# Patient Record
Sex: MALE | Race: WHITE | NOT HISPANIC OR LATINO | URBAN - METROPOLITAN AREA
[De-identification: names, ages, dates, MRNs, and addresses within clinical notes are randomized per-mention and may not be internally consistent; named-entity substitution may affect disease eponyms.]

---

## 2014-08-25 LAB — CRC RECOMMENDATION EXT: NORMAL

## 2017-08-10 ENCOUNTER — HISTORICAL (OUTPATIENT)
Dept: RADIOLOGY | Facility: HOSPITAL | Age: 52
End: 2017-08-10

## 2023-01-30 LAB
A1C: 7.4 (ref 4–6)
CHOLEST SERPL-MSCNC: 231 MG/DL (ref 25–200)
CHOLEST/HDLC SERPL: 4.7 {RATIO}
HDLC SERPL-MCNC: 49 MG/DL (ref 23–92)
LDLC SERPL CALC-MCNC: 137 MG/DL (ref 0–100)
TRIGL SERPL-MCNC: 227 MG/DL (ref 10–150)
VLDLC SERPL-MCNC: 45 MG/DL (ref 8–18)

## 2023-07-10 ENCOUNTER — TELEPHONE (OUTPATIENT)
Dept: FAMILY MEDICINE | Facility: CLINIC | Age: 58
End: 2023-07-10
Payer: COMMERCIAL

## 2023-07-10 DIAGNOSIS — E78.5 HYPERLIPIDEMIA, UNSPECIFIED HYPERLIPIDEMIA TYPE: ICD-10-CM

## 2023-07-10 DIAGNOSIS — E11.9 TYPE 2 DIABETES MELLITUS WITHOUT COMPLICATION, WITHOUT LONG-TERM CURRENT USE OF INSULIN: Primary | ICD-10-CM

## 2023-07-10 RX ORDER — GLIMEPIRIDE 1 MG/1
1 TABLET ORAL DAILY
COMMUNITY
End: 2023-07-10 | Stop reason: SDUPTHER

## 2023-07-10 RX ORDER — METFORMIN HYDROCHLORIDE 500 MG/1
500 TABLET, EXTENDED RELEASE ORAL 2 TIMES DAILY WITH MEALS
COMMUNITY
End: 2023-07-10 | Stop reason: SDUPTHER

## 2023-07-10 RX ORDER — GLIMEPIRIDE 1 MG/1
TABLET ORAL
Qty: 90 TABLET | Refills: 2 | Status: SHIPPED | OUTPATIENT
Start: 2023-07-10 | End: 2023-07-10

## 2023-07-10 RX ORDER — GLIMEPIRIDE 1 MG/1
1 TABLET ORAL
Qty: 180 TABLET | Refills: 1 | Status: SHIPPED | OUTPATIENT
Start: 2023-07-10 | End: 2023-08-08 | Stop reason: SDUPTHER

## 2023-07-10 RX ORDER — LOVASTATIN 10 MG/1
10 TABLET ORAL NIGHTLY
COMMUNITY
End: 2023-07-10 | Stop reason: SDUPTHER

## 2023-07-10 RX ORDER — METFORMIN HYDROCHLORIDE 500 MG/1
500 TABLET, EXTENDED RELEASE ORAL 2 TIMES DAILY WITH MEALS
Qty: 180 TABLET | Refills: 2 | Status: SHIPPED | OUTPATIENT
Start: 2023-07-10 | End: 2023-09-27 | Stop reason: SDUPTHER

## 2023-07-10 RX ORDER — LOVASTATIN 10 MG/1
10 TABLET ORAL NIGHTLY
Qty: 90 TABLET | Refills: 2 | Status: SHIPPED | OUTPATIENT
Start: 2023-07-10 | End: 2023-08-08 | Stop reason: DRUGHIGH

## 2023-07-10 NOTE — TELEPHONE ENCOUNTER
----- Message from Angle Velarde sent at 7/10/2023 10:57 AM CDT -----  Regarding: MED REFILLS  HE NEEDS REFILLS ON EVERYTHING BUT BP AT [facility], 90 DAY.  THEY SAID THEY SENT FAXES

## 2023-07-11 NOTE — TELEPHONE ENCOUNTER
Patient notified that Dr Hernandez wants to increase Amaryl 1 mg to 1 tablet  bid after meals. Sent new Rx to [facility] . Patient verbalized an under standing.

## 2023-08-04 ENCOUNTER — DOCUMENTATION ONLY (OUTPATIENT)
Dept: PRIMARY CARE CLINIC | Facility: CLINIC | Age: 58
End: 2023-08-04
Payer: COMMERCIAL

## 2023-08-04 PROBLEM — E11.9 TYPE 2 DIABETES MELLITUS WITHOUT COMPLICATIONS: Status: ACTIVE | Noted: 2023-08-04

## 2023-08-04 PROBLEM — E66.9 OBESITY, UNSPECIFIED: Status: ACTIVE | Noted: 2023-08-04

## 2023-08-04 PROBLEM — E78.5 HYPERLIPIDEMIA: Status: ACTIVE | Noted: 2023-08-04

## 2023-08-04 RX ORDER — FLUTICASONE PROPIONATE 50 MCG
1 SPRAY, SUSPENSION (ML) NASAL DAILY
COMMUNITY
End: 2023-09-27 | Stop reason: ALTCHOICE

## 2023-08-04 RX ORDER — GUAIFENESIN AND PHENYLEPHRINE HCL 400; 10 MG/1; MG/1
TABLET ORAL
COMMUNITY

## 2023-08-04 RX ORDER — IRBESARTAN AND HYDROCHLOROTHIAZIDE 150; 12.5 MG/1; MG/1
0.5 TABLET, FILM COATED ORAL EVERY MORNING
COMMUNITY
Start: 2023-07-07 | End: 2023-09-27 | Stop reason: SDUPTHER

## 2023-08-04 RX ORDER — VITAMIN E 268 MG
400 CAPSULE ORAL DAILY
COMMUNITY

## 2023-08-04 RX ORDER — ASCORBIC ACID/MULTIVIT-MIN 1000 MG
EFFERVESCENT POWDER IN PACKET ORAL
COMMUNITY

## 2023-08-07 PROBLEM — G47.33 OSA (OBSTRUCTIVE SLEEP APNEA): Status: ACTIVE | Noted: 2023-08-07

## 2023-08-07 PROBLEM — K76.0 FATTY LIVER: Status: ACTIVE | Noted: 2023-08-07

## 2023-08-07 PROBLEM — R80.9 ALBUMINURIA: Status: ACTIVE | Noted: 2023-08-07

## 2023-08-07 PROBLEM — I10 HYPERTENSION: Status: ACTIVE | Noted: 2023-08-07

## 2023-08-08 ENCOUNTER — OFFICE VISIT (OUTPATIENT)
Dept: FAMILY MEDICINE | Facility: CLINIC | Age: 58
End: 2023-08-08
Payer: COMMERCIAL

## 2023-08-08 VITALS
SYSTOLIC BLOOD PRESSURE: 130 MMHG | TEMPERATURE: 97 F | BODY MASS INDEX: 44.92 KG/M2 | WEIGHT: 313.81 LBS | OXYGEN SATURATION: 96 % | HEART RATE: 69 BPM | RESPIRATION RATE: 16 BRPM | HEIGHT: 70 IN | DIASTOLIC BLOOD PRESSURE: 78 MMHG

## 2023-08-08 DIAGNOSIS — L25.9 CONTACT DERMATITIS, UNSPECIFIED CONTACT DERMATITIS TYPE, UNSPECIFIED TRIGGER: ICD-10-CM

## 2023-08-08 DIAGNOSIS — E11.9 TYPE 2 DIABETES MELLITUS WITHOUT COMPLICATION, WITHOUT LONG-TERM CURRENT USE OF INSULIN: Primary | ICD-10-CM

## 2023-08-08 DIAGNOSIS — E78.2 MIXED HYPERLIPIDEMIA: ICD-10-CM

## 2023-08-08 DIAGNOSIS — I10 PRIMARY HYPERTENSION: ICD-10-CM

## 2023-08-08 DIAGNOSIS — J32.9 SINUSITIS, UNSPECIFIED CHRONICITY, UNSPECIFIED LOCATION: ICD-10-CM

## 2023-08-08 PROBLEM — R05.8 COUGH PRODUCTIVE OF YELLOW SPUTUM: Status: ACTIVE | Noted: 2023-08-08

## 2023-08-08 PROCEDURE — 3008F PR BODY MASS INDEX (BMI) DOCUMENTED: ICD-10-PCS | Mod: CPTII,,, | Performed by: FAMILY MEDICINE

## 2023-08-08 PROCEDURE — 1159F PR MEDICATION LIST DOCUMENTED IN MEDICAL RECORD: ICD-10-PCS | Mod: CPTII,,, | Performed by: FAMILY MEDICINE

## 2023-08-08 PROCEDURE — 99214 PR OFFICE/OUTPT VISIT, EST, LEVL IV, 30-39 MIN: ICD-10-PCS | Mod: ,,, | Performed by: FAMILY MEDICINE

## 2023-08-08 PROCEDURE — 3051F HG A1C>EQUAL 7.0%<8.0%: CPT | Mod: CPTII,,, | Performed by: FAMILY MEDICINE

## 2023-08-08 PROCEDURE — 3008F BODY MASS INDEX DOCD: CPT | Mod: CPTII,,, | Performed by: FAMILY MEDICINE

## 2023-08-08 PROCEDURE — 3051F PR MOST RECENT HEMOGLOBIN A1C LEVEL 7.0 - < 8.0%: ICD-10-PCS | Mod: CPTII,,, | Performed by: FAMILY MEDICINE

## 2023-08-08 PROCEDURE — 3075F SYST BP GE 130 - 139MM HG: CPT | Mod: CPTII,,, | Performed by: FAMILY MEDICINE

## 2023-08-08 PROCEDURE — 3078F DIAST BP <80 MM HG: CPT | Mod: CPTII,,, | Performed by: FAMILY MEDICINE

## 2023-08-08 PROCEDURE — 3075F PR MOST RECENT SYSTOLIC BLOOD PRESS GE 130-139MM HG: ICD-10-PCS | Mod: CPTII,,, | Performed by: FAMILY MEDICINE

## 2023-08-08 PROCEDURE — 99214 OFFICE O/P EST MOD 30 MIN: CPT | Mod: ,,, | Performed by: FAMILY MEDICINE

## 2023-08-08 PROCEDURE — 3078F PR MOST RECENT DIASTOLIC BLOOD PRESSURE < 80 MM HG: ICD-10-PCS | Mod: CPTII,,, | Performed by: FAMILY MEDICINE

## 2023-08-08 PROCEDURE — 1159F MED LIST DOCD IN RCRD: CPT | Mod: CPTII,,, | Performed by: FAMILY MEDICINE

## 2023-08-08 RX ORDER — CEFUROXIME AXETIL 500 MG/1
500 TABLET ORAL 2 TIMES DAILY
Qty: 14 TABLET | Refills: 0 | Status: SHIPPED | OUTPATIENT
Start: 2023-08-08 | End: 2023-09-27

## 2023-08-08 RX ORDER — GLIMEPIRIDE 1 MG/1
1 TABLET ORAL
Qty: 180 TABLET | Refills: 1 | Status: SHIPPED | OUTPATIENT
Start: 2023-08-08 | End: 2023-09-27 | Stop reason: SDUPTHER

## 2023-08-08 RX ORDER — ORAL SEMAGLUTIDE 7 MG/1
7 TABLET ORAL DAILY
Qty: 90 TABLET | Refills: 1 | Status: SHIPPED | OUTPATIENT
Start: 2023-08-08 | End: 2023-09-27 | Stop reason: SDUPTHER

## 2023-08-08 RX ORDER — LOVASTATIN 20 MG/1
20 TABLET ORAL NIGHTLY
Qty: 90 TABLET | Refills: 3 | Status: SHIPPED | OUTPATIENT
Start: 2023-08-08 | End: 2023-09-27 | Stop reason: ALTCHOICE

## 2023-08-08 NOTE — PROGRESS NOTES
Patient Name: Herbert Ramirez     Patient ID: [id number]     Chief Complaint: Results (Go over lab results.), Rash (Right forearm), and Sinus Problem (Patient says he may have been exposed to covid and had sinus problems since.)      HPI:     Herbert Ramirez is a 57 y.o. male here today for f/u diabetes & lab results. Patient says he is having sinus congestion and a cough.  He did have a rash on the right forearm but says it is almost gone now.    Past Medical History:   Diagnosis Date    Albuminuria     Elevated LFTs     Fatty liver     History of cataract     Hyperlipidemia     Hypertension     Obese     Obesity, unspecified     RAYA (obstructive sleep apnea)     Seasonal allergies     Type 2 diabetes mellitus without complications     Umbilical hernia     Venous stasis         Past Surgical History:   Procedure Laterality Date    CATARACT EXTRACTION Right     NOSE SURGERY      UMBILICAL HERNIA REPAIR          Social History     Socioeconomic History    Marital status:    Tobacco Use    Smoking status: Never    Smokeless tobacco: Never   Substance and Sexual Activity    Alcohol use: Not Currently    Drug use: Never    Sexual activity: Yes        Current Outpatient Medications   Medication Instructions    albuterol sulfate (VENTOLIN HFA INHL) Inhalation, As needed (PRN)    ascorbic acid-multivit-min (EMERGEN-C) 1,000 mg PwEP Oral    calcium carbonate/vitamin D3 (VITAMIN D-3 ORAL) 1,000 mg, Oral    cefUROXime (CEFTIN) 500 mg, Oral, 2 times daily    FLAXSEED ORAL Oral    fluticasone propionate (FLONASE) 50 mcg/actuation nasal spray 1 spray, Each Nostril, Daily    glimepiride (AMARYL) 1 mg, Oral, 2 times daily after meals, Take 1 tablet by mouth every morning after breakfast    irbesartan-hydrochlorothiazide (AVALIDE) 150-12.5 mg per tablet 0.5 tablets, Oral, Every morning    Lactobac no.41/Bifidobact no.7 (PROBIOTIC-10 ORAL) 10 mg, Oral, Daily    lovastatin (MEVACOR) 20 mg, Oral, Nightly    metFORMIN  "(GLUCOPHAGE-XR) 500 mg, Oral, 2 times daily with meals    RYBELSUS 7 mg, Oral, Daily    turmeric root extract 500 mg Cap Oral    vitamin E 400 Units, Oral, Daily    ZINC ACETATE ORAL Oral       Review of patient's allergies indicates:   Allergen Reactions    Statins-hmg-coa reductase inhibitors     Trulicity [dulaglutide]           There is no immunization history on file for this patient.    Patient Care Team:  Isaac Hernandez Sr., MD as PCP - General (Family Medicine)     Subjective:     Review of Systems    10 point review of systems conducted, negative except as stated in the history of present illness. See HPI for details.    Objective:     Visit Vitals  /78 (BP Location: Right arm, Patient Position: Sitting, BP Method: Large (Manual))   Pulse 69   Temp 97.3 °F (36.3 °C)   Resp 16   Ht 5' 10" (1.778 m)   Wt (!) 142.3 kg (313 lb 12.8 oz)   SpO2 96%   BMI 45.03 kg/m²       Physical Exam  Constitutional:       Appearance: He is obese.   HENT:      Head: Normocephalic and atraumatic.      Right Ear: Tympanic membrane, ear canal and external ear normal.      Left Ear: Tympanic membrane, ear canal and external ear normal.      Nose: Congestion present.      Mouth/Throat:      Comments: Pharynx is mildly injected with postnasal drip.  Cardiovascular:      Rate and Rhythm: Normal rate and regular rhythm.      Heart sounds: Normal heart sounds.   Pulmonary:      Effort: Pulmonary effort is normal.      Breath sounds: Normal breath sounds.   Abdominal:      General: Abdomen is flat.      Palpations: Abdomen is soft.      Tenderness: There is no abdominal tenderness.   Musculoskeletal:         General: No swelling or tenderness. Normal range of motion.      Cervical back: Normal range of motion and neck supple.      Right lower leg: No edema.      Left lower leg: No edema.   Lymphadenopathy:      Cervical: No cervical adenopathy.   Skin:     General: Skin is warm and dry.      Comments: There is a slightly " reddened fading rash on the proximal right forearm   Neurological:      General: No focal deficit present.      Mental Status: He is alert and oriented to person, place, and time.   Psychiatric:         Mood and Affect: Mood normal.           Assessment:       ICD-10-CM ICD-9-CM   1. Type 2 diabetes mellitus without complication, without long-term current use of insulin  E11.9 250.00   2. Primary hypertension  I10 401.9   3. Mixed hyperlipidemia  E78.2 272.2   4. Sinusitis, unspecified chronicity, unspecified location  J32.9 473.9   5. Contact dermatitis, unspecified contact dermatitis type, unspecified trigger  L25.9 692.9        Plan:     1. Type 2 diabetes mellitus without complication, without long-term current use of insulin  Overview:    Follow ADA Diet. Avoid soda, simple sweets, and limit rice/pasta/breads/starches (no more than 45-50 grams per meal).  Maintain healthy weight with goal BMI <30.  Exercise 5 times per week for 30 minutes per day.  Stressed importance of daily foot exams especially if neuropathy is present.  Patient was instructed to notify physician if they notice any sores or skin lesions on the feet.  Stressed the importance of wearing proper fitting comfortable shoes i.e. diabetic footwear.  They were instructed to always wear shoes and never go barefooted.  Stressed importance of annual dilated eye exam.          Assessment & Plan:  A1C=8.9%.  Patient's insurance recently changed and the diabetic medication coverage associated with his new plan is quite poor.  At the present time he is on metformin 500 mg b.i.d. and glimepiride 1 mg b.i d. We will have him increase his glimepiride to 1 mg in a.m. and 2 HS.  We will try to add Rybelsus 3mg.one po q day added to daily regimen.  Hopefully his new plan will allow him to get this medication as his diabetes is presently under poor control.    Orders:  -     semaglutide (RYBELSUS) 7 mg tablet; Take 1 tablet (7 mg total) by mouth once daily.   Dispense: 90 tablet; Refill: 1  -     glimepiride (AMARYL) 1 MG tablet; Take 1 tablet (1 mg total) by mouth 2 times daily 2 hours after meal. Take 1 tablet by mouth every morning after breakfast  Dispense: 180 tablet; Refill: 1  -     Hemoglobin A1C; Future; Expected date: 10/09/2023  -     Lipid Panel; Future; Expected date: 10/09/2023  -     Comprehensive Metabolic Panel; Future; Expected date: 10/09/2023    2. Primary hypertension  Overview:  Continue present med tx.  Patient is presently on irbesartan hydrochlorothiazide 150/12.5 daily  Low Sodium Diet (DASH Diet - Less than 2 grams of sodium per day).  Monitor blood pressure daily and log. Report consistent numbers greater than 140/90.  Maintain healthy weight with goal BMI <30. Exercise 30 minutes per day, 5 days per week.    Assessment & Plan:  B/P well controlled at 130/78.    Orders:  -     Lipid Panel; Future; Expected date: 10/09/2023  -     Comprehensive Metabolic Panel; Future; Expected date: 10/09/2023    3. Mixed hyperlipidemia  Overview:  Stressed importance of dietary modifications. Follow a low cholesterol, low saturated fat diet with less that 200mg of cholesterol a day.  Avoid fried foods and high saturated fats (high saturated fats less than 7% of calories).  Add Flax Seed/Fish Oil supplements to diet. Increase dietary fiber.  Regular exercise can reduce LDL and raise HDL. Stressed importance of physical activity 5 times per week for 30 minutes per day.     Assessment & Plan:  LDL elevated =161mg/dl.  Patient has been on Mevacor 10 mg a day. Mevacor increased to 20mg.q day.  Obviously his cholesterol is not at goal.  Patient's insurance coverage recently changed and as a result he is limited on what statins we can use, thus the reason for the patient being on Mevacor.  In addition to formulary limitation patient also has a partial statin intolerance which further limits our choice of cholesterol medication.    Dietary adherence discussed.    Healthy food choices emphasized. Pt. Verbalizes understanding.    Orders:  -     lovastatin (MEVACOR) 20 MG tablet; Take 1 tablet (20 mg total) by mouth every evening.  Dispense: 90 tablet; Refill: 3  -     Lipid Panel; Future; Expected date: 10/09/2023  -     Comprehensive Metabolic Panel; Future; Expected date: 10/09/2023    4. Sinusitis, unspecified chronicity, unspecified location  Comments:  He is being given a prescription for Ceftin 500 mg b.i.d. for 7 days.  I suggested he get an over-the-counter steroid nasal spray and use as directed.  Orders:  -     cefUROXime (CEFTIN) 500 MG tablet; Take 1 tablet (500 mg total) by mouth 2 (two) times a day.  Dispense: 14 tablet; Refill: 0    5. Contact dermatitis, unspecified contact dermatitis type, unspecified trigger  Comments:  Patient has what looks like a resolving contact dermatitis to the right forearm.  No specific treatment is required at this time.        [x] Discussed lab findings with the patient.  [x] Discussed diet, exercise and if appropriate, weight loss.  [x] Instructions and information, including risks and benefits of prescribed medication(s) have been reviewed with the patient and patient verbalizes understanding. Questions have been answered to the patient's satisfaction.  [] Appropriate counseling has been given regarding anxiety and depressive issues that were discussed today.  [] Any lab drawn today will be reviewed by physician at the time it is received and appropriate recommendations bill be made and discussed with patient.     Follow up in about 2 months (around 10/8/2023) for With Fasting Labs prior to visit.   In addition to their scheduled follow up, the patient has also been instructed to follow up on as needed basis.     Future Appointments   Date Time Provider Department Center   10/11/2023  8:40 AM NURSE, [facility]   10/16/2023  8:00 AM Isaac Hernandez Sr., MD [facility]         Isaac Hernandez Sr, MD

## 2023-08-08 NOTE — ASSESSMENT & PLAN NOTE
LDL elevated =161mg/dl.  Patient has been on Mevacor 10 mg a day. Mevacor increased to 20mg.q day.  Obviously his cholesterol is not at goal.  Patient's insurance coverage recently changed and as a result he is limited on what statins we can use, thus the reason for the patient being on Mevacor.  In addition to formulary limitation patient also has a partial statin intolerance which further limits our choice of cholesterol medication.    Dietary adherence discussed.   Healthy food choices emphasized. Pt. Verbalizes understanding.

## 2023-08-08 NOTE — ASSESSMENT & PLAN NOTE
A1C=8.9%.  Patient's insurance recently changed and the diabetic medication coverage associated with his new plan is quite poor.  At the present time he is on metformin 500 mg b.i.d. and glimepiride 1 mg b.i d. We will have him increase his glimepiride to 1 mg in a.m. and 2 HS.  We will try to add Rybelsus 3mg.one po q day added to daily regimen.  Hopefully his new plan will allow him to get this medication as his diabetes is presently under poor control.

## 2023-08-14 ENCOUNTER — TELEPHONE (OUTPATIENT)
Dept: FAMILY MEDICINE | Facility: CLINIC | Age: 58
End: 2023-08-14
Payer: COMMERCIAL

## 2023-08-14 NOTE — TELEPHONE ENCOUNTER
----- Message from Angle Velarde sent at 8/14/2023 10:36 AM CDT -----  Regarding: NOTES FOR PA  [facility] [place] CALLED REQUESTING MOST RECENT CHART NOTES FOR PA FOR [facility]BEKA

## 2023-08-28 ENCOUNTER — TELEPHONE (OUTPATIENT)
Dept: FAMILY MEDICINE | Facility: CLINIC | Age: 58
End: 2023-08-28
Payer: COMMERCIAL

## 2023-08-28 NOTE — TELEPHONE ENCOUNTER
Called and notified patient that PA was approved for Rybelsus 7 mg 1 daily. Called [facility] patient's copay will be $10.00 a month Patient instructed he needs to first start on Rybelsus 3 mg 1 daily for the first month and recheck A1C.  One month supply was provided .Patient scheduled for 9/25/23 @8:30 for lab CMP,A1C and Lipid Panel.and Results on 9/27/23 @ 8:15 a m .Patient verbalized an understanding.

## 2023-08-29 ENCOUNTER — TELEPHONE (OUTPATIENT)
Dept: FAMILY MEDICINE | Facility: CLINIC | Age: 58
End: 2023-08-29
Payer: COMMERCIAL

## 2023-08-29 NOTE — TELEPHONE ENCOUNTER
----- Message from Sarah Bagley sent at 8/22/2023  4:39 PM CDT -----  Regarding: Test request  Patient is requesting a Calcium Score.

## 2023-08-31 LAB
LEFT EYE DM RETINOPATHY: NEGATIVE
RIGHT EYE DM RETINOPATHY: NEGATIVE

## 2023-09-27 ENCOUNTER — OFFICE VISIT (OUTPATIENT)
Dept: FAMILY MEDICINE | Facility: CLINIC | Age: 58
End: 2023-09-27
Payer: COMMERCIAL

## 2023-09-27 VITALS
SYSTOLIC BLOOD PRESSURE: 135 MMHG | RESPIRATION RATE: 16 BRPM | DIASTOLIC BLOOD PRESSURE: 70 MMHG | HEART RATE: 63 BPM | HEIGHT: 70 IN | WEIGHT: 315 LBS | TEMPERATURE: 98 F | BODY MASS INDEX: 45.1 KG/M2 | OXYGEN SATURATION: 94 %

## 2023-09-27 DIAGNOSIS — E11.9 TYPE 2 DIABETES MELLITUS WITHOUT COMPLICATION, WITHOUT LONG-TERM CURRENT USE OF INSULIN: Primary | ICD-10-CM

## 2023-09-27 DIAGNOSIS — E66.01 CLASS 3 SEVERE OBESITY DUE TO EXCESS CALORIES WITH SERIOUS COMORBIDITY AND BODY MASS INDEX (BMI) OF 45.0 TO 49.9 IN ADULT: ICD-10-CM

## 2023-09-27 DIAGNOSIS — I10 PRIMARY HYPERTENSION: ICD-10-CM

## 2023-09-27 DIAGNOSIS — K76.0 FATTY LIVER: ICD-10-CM

## 2023-09-27 DIAGNOSIS — E78.2 MIXED HYPERLIPIDEMIA: ICD-10-CM

## 2023-09-27 PROBLEM — R79.89 ELEVATED LFTS: Status: ACTIVE | Noted: 2023-09-27

## 2023-09-27 PROBLEM — K42.9 UMBILICAL HERNIA: Status: ACTIVE | Noted: 2023-09-27

## 2023-09-27 PROBLEM — Z86.69 HISTORY OF CATARACT: Status: ACTIVE | Noted: 2023-09-27

## 2023-09-27 PROBLEM — E66.813 CLASS 3 SEVERE OBESITY DUE TO EXCESS CALORIES WITH SERIOUS COMORBIDITY AND BODY MASS INDEX (BMI) OF 45.0 TO 49.9 IN ADULT: Status: ACTIVE | Noted: 2023-08-04

## 2023-09-27 PROBLEM — I87.8 VENOUS STASIS: Status: ACTIVE | Noted: 2023-09-27

## 2023-09-27 PROBLEM — J30.2 SEASONAL ALLERGIES: Status: ACTIVE | Noted: 2023-09-27

## 2023-09-27 PROCEDURE — 3078F DIAST BP <80 MM HG: CPT | Mod: CPTII,,, | Performed by: FAMILY MEDICINE

## 2023-09-27 PROCEDURE — 3052F HG A1C>EQUAL 8.0%<EQUAL 9.0%: CPT | Mod: CPTII,,, | Performed by: FAMILY MEDICINE

## 2023-09-27 PROCEDURE — 1159F PR MEDICATION LIST DOCUMENTED IN MEDICAL RECORD: ICD-10-PCS | Mod: CPTII,,, | Performed by: FAMILY MEDICINE

## 2023-09-27 PROCEDURE — 1159F MED LIST DOCD IN RCRD: CPT | Mod: CPTII,,, | Performed by: FAMILY MEDICINE

## 2023-09-27 PROCEDURE — 3078F PR MOST RECENT DIASTOLIC BLOOD PRESSURE < 80 MM HG: ICD-10-PCS | Mod: CPTII,,, | Performed by: FAMILY MEDICINE

## 2023-09-27 PROCEDURE — 3008F PR BODY MASS INDEX (BMI) DOCUMENTED: ICD-10-PCS | Mod: CPTII,,, | Performed by: FAMILY MEDICINE

## 2023-09-27 PROCEDURE — 99214 PR OFFICE/OUTPT VISIT, EST, LEVL IV, 30-39 MIN: ICD-10-PCS | Mod: ,,, | Performed by: FAMILY MEDICINE

## 2023-09-27 PROCEDURE — 3075F PR MOST RECENT SYSTOLIC BLOOD PRESS GE 130-139MM HG: ICD-10-PCS | Mod: CPTII,,, | Performed by: FAMILY MEDICINE

## 2023-09-27 PROCEDURE — 1160F RVW MEDS BY RX/DR IN RCRD: CPT | Mod: CPTII,,, | Performed by: FAMILY MEDICINE

## 2023-09-27 PROCEDURE — 99214 OFFICE O/P EST MOD 30 MIN: CPT | Mod: ,,, | Performed by: FAMILY MEDICINE

## 2023-09-27 PROCEDURE — 1160F PR REVIEW ALL MEDS BY PRESCRIBER/CLIN PHARMACIST DOCUMENTED: ICD-10-PCS | Mod: CPTII,,, | Performed by: FAMILY MEDICINE

## 2023-09-27 PROCEDURE — 3052F PR MOST RECENT HEMOGLOBIN A1C LEVEL 8.0 - < 9.0%: ICD-10-PCS | Mod: CPTII,,, | Performed by: FAMILY MEDICINE

## 2023-09-27 PROCEDURE — 3008F BODY MASS INDEX DOCD: CPT | Mod: CPTII,,, | Performed by: FAMILY MEDICINE

## 2023-09-27 PROCEDURE — 3075F SYST BP GE 130 - 139MM HG: CPT | Mod: CPTII,,, | Performed by: FAMILY MEDICINE

## 2023-09-27 RX ORDER — PNV NO.95/FERROUS FUM/FOLIC AC 28MG-0.8MG
TABLET ORAL DAILY
COMMUNITY

## 2023-09-27 RX ORDER — GLIMEPIRIDE 1 MG/1
1 TABLET ORAL
Qty: 180 TABLET | Refills: 3 | Status: SHIPPED | OUTPATIENT
Start: 2023-09-27 | End: 2024-01-03

## 2023-09-27 RX ORDER — ORAL SEMAGLUTIDE 7 MG/1
7 TABLET ORAL DAILY
Qty: 90 TABLET | Refills: 3 | Status: SHIPPED | OUTPATIENT
Start: 2023-09-27

## 2023-09-27 RX ORDER — METFORMIN HYDROCHLORIDE 500 MG/1
500 TABLET, EXTENDED RELEASE ORAL 2 TIMES DAILY WITH MEALS
Qty: 180 TABLET | Refills: 3 | Status: SHIPPED | OUTPATIENT
Start: 2023-09-27 | End: 2024-01-03 | Stop reason: SDUPTHER

## 2023-09-27 RX ORDER — PSYLLIUM SEED
PACKET (EA) ORAL DAILY
COMMUNITY

## 2023-09-27 RX ORDER — SELENIUM 200 MCG
200 TABLET ORAL DAILY
COMMUNITY

## 2023-09-27 RX ORDER — CALCIUM CARBONATE 600 MG
TABLET ORAL DAILY
COMMUNITY

## 2023-09-27 RX ORDER — IBUPROFEN 100 MG/5ML
1000 SUSPENSION, ORAL (FINAL DOSE FORM) ORAL DAILY
COMMUNITY

## 2023-09-27 RX ORDER — GLUCOSAM/CHONDRO/HERB 149/HYAL 750-100 MG
1 TABLET ORAL DAILY
COMMUNITY

## 2023-09-27 RX ORDER — PYRIDOXINE HCL (VITAMIN B6) 250 MG
250 TABLET ORAL DAILY
COMMUNITY

## 2023-09-27 RX ORDER — PITAVASTATIN CALCIUM 1.04 MG/1
1 TABLET, FILM COATED ORAL NIGHTLY
Qty: 90 TABLET | Refills: 3 | Status: SHIPPED | OUTPATIENT
Start: 2023-09-27 | End: 2023-09-28 | Stop reason: SDUPTHER

## 2023-09-27 RX ORDER — DAPAGLIFLOZIN 10 MG/1
10 TABLET, FILM COATED ORAL DAILY
Qty: 90 TABLET | Refills: 3 | Status: SHIPPED | OUTPATIENT
Start: 2023-09-27 | End: 2024-02-26 | Stop reason: SDUPTHER

## 2023-09-27 RX ORDER — IRBESARTAN AND HYDROCHLOROTHIAZIDE 150; 12.5 MG/1; MG/1
0.5 TABLET, FILM COATED ORAL EVERY MORNING
Qty: 90 TABLET | Refills: 3 | Status: SHIPPED | OUTPATIENT
Start: 2023-09-27

## 2023-09-27 NOTE — ASSESSMENT & PLAN NOTE
Most recent AST 42 IU/L and ALT 38 IU/L on 09/25/2023.  His AST is slightly elevated.  Will continue to monitor.

## 2023-09-27 NOTE — ASSESSMENT & PLAN NOTE
Most recent HbA1c 8.4 % on 09/25/2023.  Increase Rybelsus to 7 mg daily.  Will start Farxiga 5 mg daily for 2 weeks, patient given samples. Then increase to Farxiga 10 mg daily.  We had a discussion about patient's glimepiride medication.  As his sugars improve I would like him to start weaning off the glimepiride and eventually I would like him off of it.  Patient voiced a clear understanding of what objective is here

## 2023-09-27 NOTE — PROGRESS NOTES
Patient Name: Herbert Ramirez     Patient ID: [id number]     Chief Complaint: Results ( Over CT Calcium Score results.)      HPI:     Herbert Ramirez is a 58 y.o. male here today for CT Calcium Score result and lab work results. Reviewed and discussed CT Calcium Score result and lab work results.    Past Medical History:   Diagnosis Date    Albuminuria     Elevated LFTs     Fatty liver     History of cataract     Hyperlipidemia     Hypertension     Obesity, unspecified     RAYA (obstructive sleep apnea)     Seasonal allergies     Type 2 diabetes mellitus without complications     Umbilical hernia     Venous stasis         Past Surgical History:   Procedure Laterality Date    CATARACT EXTRACTION Right     NOSE SURGERY      UMBILICAL HERNIA REPAIR          Social History     Socioeconomic History    Marital status:    Tobacco Use    Smoking status: Never    Smokeless tobacco: Never   Substance and Sexual Activity    Alcohol use: Not Currently    Drug use: Never    Sexual activity: Yes        Current Outpatient Medications   Medication Instructions    ascorbic acid (vitamin C) (VITAMIN C) 1,000 mg, Oral, Daily    ascorbic acid-multivit-min (EMERGEN-C) 1,000 mg PwEP Oral    calcium carbonate (OS-NELIA) 600 mg calcium (1,500 mg) Tab Oral, Daily    calcium carbonate/vitamin D3 (VITAMIN D-3 ORAL) 1,000 mg, Oral    cyanocobalamin (VITAMIN B-12) 100 MCG tablet Oral, Daily    FARXIGA 10 mg, Oral, Daily    FLAXSEED ORAL Oral    glimepiride (AMARYL) 1 mg, Oral, 2 times daily after meals, Take 1 tablet by mouth every morning after breakfast    irbesartan-hydrochlorothiazide (AVALIDE) 150-12.5 mg per tablet 0.5 tablets, Oral, Every morning    Lactobac no.41/Bifidobact no.7 (PROBIOTIC-10 ORAL) 10 mg, Oral, Daily    metFORMIN (GLUCOPHAGE-XR) 500 mg, Oral, 2 times daily with meals    omega 3-dha-epa-fish oil (FISH OIL) 1,000 mg (120 mg-180 mg) Cap 1 capsule, Oral, Daily    pitavastatin calcium (LIVALO) 1 mg, Oral, Nightly     "psyllium husk (METAMUCIL) 3.4 gram/5.4 gram Powd Oral, Daily    pyridoxine (vitamin B6) (VITAMIN B-6) 250 mg, Oral, Daily    RYBELSUS 7 mg, Oral, Daily    selenium 200 mcg, Oral, Daily    turmeric root extract 500 mg Cap Oral    vitamin E 400 Units, Oral, Daily    ZINC ACETATE ORAL Oral       Review of patient's allergies indicates:   Allergen Reactions    Statins-hmg-coa reductase inhibitors     Trulicity [dulaglutide]           There is no immunization history on file for this patient.    Patient Care Team:  Isaac Hernandez Sr., MD as PCP - General (Family Medicine)     Subjective:     Review of Systems    10 point review of systems conducted, negative except as stated in the history of present illness. See HPI for details.    Objective:     Visit Vitals  /70 (BP Location: Left arm, Patient Position: Sitting, BP Method: Large (Manual))   Pulse 63   Temp 98.4 °F (36.9 °C)   Resp 16   Ht 5' 10" (1.778 m)   Wt (!) 145.4 kg (320 lb 9.6 oz)   SpO2 (!) 94%   BMI 46.00 kg/m²       Physical Exam  Constitutional:       Appearance: He is obese.   HENT:      Head: Normocephalic and atraumatic.   Cardiovascular:      Rate and Rhythm: Normal rate and regular rhythm.   Pulmonary:      Effort: Pulmonary effort is normal.      Breath sounds: Normal breath sounds.   Abdominal:      Palpations: Abdomen is soft.      Tenderness: There is no abdominal tenderness.   Musculoskeletal:         General: No swelling or tenderness. Normal range of motion.      Cervical back: Normal range of motion and neck supple.      Right lower leg: No edema.      Left lower leg: No edema.   Lymphadenopathy:      Cervical: No cervical adenopathy.   Skin:     General: Skin is warm and dry.   Neurological:      General: No focal deficit present.      Mental Status: He is alert and oriented to person, place, and time.   Psychiatric:         Mood and Affect: Mood normal.           Assessment:       ICD-10-CM ICD-9-CM   1. Type 2 diabetes " mellitus without complication, without long-term current use of insulin  E11.9 250.00   2. Primary hypertension  I10 401.9   3. Mixed hyperlipidemia  E78.2 272.2   4. Fatty liver  K76.0 571.8   5. Class 3 severe obesity due to excess calories with serious comorbidity and body mass index (BMI) of 45.0 to 49.9 in adult  E66.01 278.01    Z68.42 V85.42        Plan:     1. Type 2 diabetes mellitus without complication, without long-term current use of insulin  Overview:  Continue with Amaryl 1 mg BID PC + Metformin  mg BID PC.  Patient is not well controlled.  Rybelsus 3 mg daily.  Follow ADA Diet. Avoid soda, simple sweets, and limit rice/pasta/breads/starches (no more than 45-50 grams per meal).  Maintain healthy weight with goal BMI <30.  Exercise 5 times per week for 30 minutes per day.  Stressed importance of daily foot exams especially if neuropathy is present.  Patient was instructed to notify physician if they notice any sores or skin lesions on the feet.  Stressed the importance of wearing proper fitting comfortable shoes i.e. diabetic footwear.  They were instructed to always wear shoes and never go barefooted.  Stressed importance of annual dilated eye exam.          Assessment & Plan:  Most recent HbA1c 8.4 % on 09/25/2023.  Increase Rybelsus to 7 mg daily.  Will start Farxiga 5 mg daily for 2 weeks, patient given samples. Then increase to Farxiga 10 mg daily.  We had a discussion about patient's glimepiride medication.  As his sugars improve I would like him to start weaning off the glimepiride and eventually I would like him off of it.  Patient voiced a clear understanding of what objective is here    Orders:  -     dapagliflozin propanediol (FARXIGA) 10 mg tablet; Take 1 tablet (10 mg total) by mouth once daily.  Dispense: 90 tablet; Refill: 3  -     Hemoglobin A1C; Future; Expected date: 12/27/2023  -     semaglutide (RYBELSUS) 7 mg tablet; Take 1 tablet (7 mg total) by mouth once daily.  Dispense:  90 tablet; Refill: 3  -     metFORMIN (GLUCOPHAGE-XR) 500 MG ER 24hr tablet; Take 1 tablet (500 mg total) by mouth 2 (two) times daily with meals.  Dispense: 180 tablet; Refill: 3  -     glimepiride (AMARYL) 1 MG tablet; Take 1 tablet (1 mg total) by mouth 2 times daily 2 hours after meal. Take 1 tablet by mouth every morning after breakfast  Dispense: 180 tablet; Refill: 3    2. Primary hypertension  Overview:  Continue with Avalide 150/12.5 mg 1/2 tab daily.  Patient is at goal.  Low Sodium Diet (DASH Diet - Less than 2 grams of sodium per day).  Monitor blood pressure daily and log. Report consistent numbers greater than 140/90.  Maintain healthy weight with goal BMI <30. Exercise 30 minutes per day, 5 days per week.    Orders:  -     irbesartan-hydrochlorothiazide (AVALIDE) 150-12.5 mg per tablet; Take 0.5 tablets by mouth every morning.  Dispense: 90 tablet; Refill: 3    3. Mixed hyperlipidemia  Overview:  Patient is not at goal.  Stressed importance of dietary modifications. Follow a low cholesterol, low saturated fat diet with less that 200mg of cholesterol a day.  Avoid fried foods and high saturated fats (high saturated fats less than 7% of calories).  Add Flax Seed/Fish Oil supplements to diet. Increase dietary fiber.  Regular exercise can reduce LDL and raise HDL. Stressed importance of physical activity 5 times per week for 30 minutes per day.     Assessment & Plan:  Most recent  mg/dL on 09/25/2023.  D/C Lovastatin.  Will start Livalo 1 mg nightly.    Orders:  -     pitavastatin calcium (LIVALO) 1 mg Tab tablet; Take 1 tablet (1 mg total) by mouth every evening.  Dispense: 90 tablet; Refill: 3  -     Lipid Panel; Future; Expected date: 12/27/2023    4. Fatty liver  Overview:  Continue with Omega 3 Fish Oil 1,000 mg daily + Selenium 200 mcg daily + Vitamin E 400 untis daily.    Patient was encouraged to follow through with dietary modifications which are low in saturated and trans fats as well as  foods with a low glycemic index.  Intake of good fats such as olive oil fish oil avocado oil was encouraged.  Exercise and weight control are very important in maintaining a healthy liver.  Alcohol and drugs can lead to liver disease and should be avoided.  Before taking any supplements especially herbal types these should be discussed with physician.    Assessment & Plan:  Most recent AST 42 IU/L and ALT 38 IU/L on 09/25/2023.  His AST is slightly elevated.  Will continue to monitor.    Orders:  -     Hepatic Function Panel; Future; Expected date: 12/27/2023    5. Class 3 severe obesity due to excess calories with serious comorbidity and body mass index (BMI) of 45.0 to 49.9 in adult  Comments:  We will increase patient's Rybelsus to 7 mg in an effort to not only improve his A1c better help also help accomplish some weight loss.   Overview:  Body mass index is 46 kg/m².  Goal BMI <30.  Exercise 5 times a week for 30 minutes per day.  Avoid soda, simple sugars, excessive rice, potatoes or bread. Limit fast foods and fried foods.  Choose complex carbs in moderation (example: green vegetables, beans, oatmeal). Eat plenty of fresh fruits and vegetables with lean meats daily.  Do not skip meals. Eat a balanced portion size.  Avoid fad diets. Consider permanent healthy life style changes.           [x] Discussed lab findings with the patient.  [x] Discussed diet, exercise and if appropriate, weight loss.  [x] Instructions and information, including risks and benefits of prescribed medication(s) have been reviewed with the patient and patient verbalizes understanding. Questions have been answered to the patient's satisfaction.  [] Appropriate counseling has been given regarding anxiety and depressive issues that were discussed today.  [] Any lab drawn today will be reviewed by physician at the time it is received and appropriate recommendations bill be made and discussed with patient.     Follow up in about 3 months  (around 12/27/2023) for Follow Up, With Fasting Labs prior to visit.   In addition to their scheduled follow up, the patient has also been instructed to follow up on as needed basis.     Future Appointments   Date Time Provider Department Center   12/21/2023  1:50 PM NURSE, [facility]   1/3/2024  8:30 AM Isaac Hernandez Sr., MD [facility] Jb Bourgeois Sr, MD

## 2023-09-28 ENCOUNTER — TELEPHONE (OUTPATIENT)
Dept: FAMILY MEDICINE | Facility: CLINIC | Age: 58
End: 2023-09-28
Payer: COMMERCIAL

## 2023-09-28 DIAGNOSIS — E78.2 MIXED HYPERLIPIDEMIA: ICD-10-CM

## 2023-09-28 RX ORDER — PITAVASTATIN CALCIUM 1.04 MG/1
1 TABLET, FILM COATED ORAL NIGHTLY
Qty: 90 TABLET | Refills: 3 | Status: SHIPPED | OUTPATIENT
Start: 2023-09-28 | End: 2024-01-03 | Stop reason: DRUGHIGH

## 2023-09-28 NOTE — TELEPHONE ENCOUNTER
----- Message from Sarah Bagley sent at 9/28/2023  3:53 PM CDT -----  Regarding: Livalo  He found a pharmacy that is cheaper for Livalo.  [email or web address]  for Fax [phone]

## 2023-10-02 ENCOUNTER — TELEPHONE (OUTPATIENT)
Dept: FAMILY MEDICINE | Facility: CLINIC | Age: 58
End: 2023-10-02
Payer: COMMERCIAL

## 2023-10-02 NOTE — TELEPHONE ENCOUNTER
----- Message from Angle Velarde sent at 10/2/2023 10:57 AM CDT -----  Regarding: MED REFILL  [facility] CALLED THEY ARE WAITING FOR YAMILETH TO CHECK OUT. [facility] [phone]

## 2023-10-03 ENCOUNTER — TELEPHONE (OUTPATIENT)
Dept: FAMILY MEDICINE | Facility: CLINIC | Age: 58
End: 2023-10-03
Payer: COMMERCIAL

## 2023-10-03 NOTE — TELEPHONE ENCOUNTER
----- Message from Angle Velarde sent at 10/3/2023  8:11 AM CDT -----  Regarding: FOR PHIL GALEAS CALLED FROM [facility], SHE WANTS YOU TO CALL HER BACK [phone]

## 2023-11-02 ENCOUNTER — CLINICAL SUPPORT (OUTPATIENT)
Dept: FAMILY MEDICINE | Facility: CLINIC | Age: 58
End: 2023-11-02
Payer: COMMERCIAL

## 2023-11-02 VITALS — SYSTOLIC BLOOD PRESSURE: 136 MMHG | DIASTOLIC BLOOD PRESSURE: 82 MMHG | TEMPERATURE: 98 F

## 2023-11-02 DIAGNOSIS — Z23 IMMUNIZATION DUE: Primary | ICD-10-CM

## 2023-11-02 PROCEDURE — 90471 IMMUNIZATION ADMIN: CPT | Mod: ,,, | Performed by: FAMILY MEDICINE

## 2023-11-02 PROCEDURE — 90471 FLU VACCINE (QUAD) GREATER THAN OR EQUAL TO 3YO PRESERVATIVE FREE IM: ICD-10-PCS | Mod: ,,, | Performed by: FAMILY MEDICINE

## 2023-11-02 PROCEDURE — 90686 FLU VACCINE (QUAD) GREATER THAN OR EQUAL TO 3YO PRESERVATIVE FREE IM: ICD-10-PCS | Mod: ,,, | Performed by: FAMILY MEDICINE

## 2023-11-02 PROCEDURE — 90686 IIV4 VACC NO PRSV 0.5 ML IM: CPT | Mod: ,,, | Performed by: FAMILY MEDICINE

## 2023-12-18 ENCOUNTER — TELEPHONE (OUTPATIENT)
Dept: FAMILY MEDICINE | Facility: CLINIC | Age: 58
End: 2023-12-18

## 2023-12-18 NOTE — TELEPHONE ENCOUNTER
----- Message from Sarah Bagley sent at 12/18/2023 10:27 AM CST -----  Regarding: Medication list  Herbert needs a list of his medicine.  He thinks he is missing some pills that may not have been refilled.

## 2023-12-18 NOTE — TELEPHONE ENCOUNTER
Called and spoke with Herbert , We reviewed all his medications and he will check with [facility] regarding any refills.Patient verbalized an understanding.

## 2024-01-03 ENCOUNTER — OFFICE VISIT (OUTPATIENT)
Dept: FAMILY MEDICINE | Facility: CLINIC | Age: 59
End: 2024-01-03
Payer: COMMERCIAL

## 2024-01-03 VITALS
WEIGHT: 313 LBS | HEIGHT: 70 IN | DIASTOLIC BLOOD PRESSURE: 75 MMHG | RESPIRATION RATE: 17 BRPM | TEMPERATURE: 98 F | HEART RATE: 92 BPM | OXYGEN SATURATION: 95 % | SYSTOLIC BLOOD PRESSURE: 135 MMHG | BODY MASS INDEX: 44.81 KG/M2

## 2024-01-03 DIAGNOSIS — E11.9 TYPE 2 DIABETES MELLITUS WITHOUT COMPLICATION, WITHOUT LONG-TERM CURRENT USE OF INSULIN: Primary | ICD-10-CM

## 2024-01-03 DIAGNOSIS — E78.2 MIXED HYPERLIPIDEMIA: ICD-10-CM

## 2024-01-03 DIAGNOSIS — E66.01 CLASS 3 SEVERE OBESITY DUE TO EXCESS CALORIES WITH SERIOUS COMORBIDITY AND BODY MASS INDEX (BMI) OF 40.0 TO 44.9 IN ADULT: ICD-10-CM

## 2024-01-03 DIAGNOSIS — I10 PRIMARY HYPERTENSION: ICD-10-CM

## 2024-01-03 LAB
BILIRUB SERPL-MCNC: NORMAL MG/DL
BLOOD URINE, POC: NORMAL
CLARITY, POC UA: CLEAR
COLOR, POC UA: NORMAL
GLUCOSE UR QL STRIP: NORMAL
KETONES UR QL STRIP: NORMAL
LEUKOCYTE ESTERASE URINE, POC: NORMAL
NITRITE, POC UA: NORMAL
PH, POC UA: 6
PROTEIN, POC: 15
SPECIFIC GRAVITY, POC UA: 1.01
UROBILINOGEN, POC UA: 0.2

## 2024-01-03 PROCEDURE — 1160F RVW MEDS BY RX/DR IN RCRD: CPT | Mod: CPTII,,, | Performed by: FAMILY MEDICINE

## 2024-01-03 PROCEDURE — 3075F SYST BP GE 130 - 139MM HG: CPT | Mod: CPTII,,, | Performed by: FAMILY MEDICINE

## 2024-01-03 PROCEDURE — 1159F MED LIST DOCD IN RCRD: CPT | Mod: CPTII,,, | Performed by: FAMILY MEDICINE

## 2024-01-03 PROCEDURE — 3078F DIAST BP <80 MM HG: CPT | Mod: CPTII,,, | Performed by: FAMILY MEDICINE

## 2024-01-03 PROCEDURE — 99214 OFFICE O/P EST MOD 30 MIN: CPT | Mod: ,,, | Performed by: FAMILY MEDICINE

## 2024-01-03 PROCEDURE — 81002 URINALYSIS NONAUTO W/O SCOPE: CPT | Mod: ,,, | Performed by: FAMILY MEDICINE

## 2024-01-03 PROCEDURE — 3008F BODY MASS INDEX DOCD: CPT | Mod: CPTII,,, | Performed by: FAMILY MEDICINE

## 2024-01-03 RX ORDER — GLIMEPIRIDE 1 MG/1
1 TABLET ORAL
Start: 2024-01-03

## 2024-01-03 RX ORDER — LOVASTATIN 10 MG/1
10 TABLET ORAL
COMMUNITY
Start: 2023-10-06 | End: 2024-01-03 | Stop reason: ALTCHOICE

## 2024-01-03 RX ORDER — PITAVASTATIN 2 MG/1
2 TABLET, FILM COATED ORAL NIGHTLY
Qty: 90 TABLET | Refills: 3 | Status: SHIPPED | OUTPATIENT
Start: 2024-01-03

## 2024-01-03 RX ORDER — METFORMIN HYDROCHLORIDE 500 MG/1
500 TABLET, EXTENDED RELEASE ORAL 2 TIMES DAILY WITH MEALS
Qty: 180 TABLET | Refills: 3 | Status: SHIPPED | OUTPATIENT
Start: 2024-01-03

## 2024-01-03 NOTE — LETTER
AUTHORIZATION FOR RELEASE OF   CONFIDENTIAL INFORMATION    Dear Dr. Manjarrez's Office,    We are seeing Herbert Ramirez, date of birth 1965, in the clinic at [facility]. Isaac Hernandez Sr., MD is the patient's PCP. Herbert Ramirez has an outstanding lab/procedure at the time we reviewed his chart. In order to help keep his health information updated, he has authorized us to request the following medical record(s):        (  )  MAMMOGRAM                                      (  )  COLONOSCOPY      (  )  PAP SMEAR                                          (  )  OUTSIDE LAB RESULTS     (  )  DEXA SCAN                                          (  )  EYE EXAM            ( X )  FOOT EXAM                                          (  )  ENTIRE RECORD     (  )  OUTSIDE IMMUNIZATIONS                 (  )  _______________         Please fax records to [facility], Bourgeois, Leonard Jb. Sr., MD, [phone]     If you have any questions, please contact our office at [phone].           Patient Name: Herbert Ramirez  : 1965  Patient Phone #: [phone]

## 2024-01-03 NOTE — PROGRESS NOTES
Patient Name: Herbert Ramirez     Patient ID: [id number]     Chief Complaint: Follow-up (3 month follow-up) and Results (Go over lab results.)      HPI:     Herbert Ramirez is a 58 y.o. male here today for follow up for Diabetes, Hyperlipidemia, Hypertension, and lab work results. Reviewed and discussed lab work results.  Past Medical History:   Diagnosis Date    Albuminuria     Elevated LFTs     Fatty liver     History of cataract     Hyperlipidemia     Hypertension     Obesity, unspecified     RAYA (obstructive sleep apnea)     Seasonal allergies     Type 2 diabetes mellitus without complications     Umbilical hernia     Venous stasis         Past Surgical History:   Procedure Laterality Date    CATARACT EXTRACTION Right     NOSE SURGERY      UMBILICAL HERNIA REPAIR          Social History     Socioeconomic History    Marital status:    Tobacco Use    Smoking status: Never    Smokeless tobacco: Never   Substance and Sexual Activity    Alcohol use: Not Currently    Drug use: Never    Sexual activity: Yes     Social Determinants of Health     Financial Resource Strain: Low Risk  (1/3/2024)    Overall Financial Resource Strain (CARDIA)     Difficulty of Paying Living Expenses: Not hard at all   Food Insecurity: No Food Insecurity (1/3/2024)    Hunger Vital Sign     Worried About Running Out of Food in the Last Year: Never true     Ran Out of Food in the Last Year: Never true   Transportation Needs: No Transportation Needs (1/3/2024)    PRAPARE - Transportation     Lack of Transportation (Medical): No     Lack of Transportation (Non-Medical): No   Physical Activity: Inactive (1/3/2024)    Exercise Vital Sign     Days of Exercise per Week: 0 days     Minutes of Exercise per Session: 0 min   Stress: No Stress Concern Present (1/3/2024)    [employer] - Occupational Stress Questionnaire     Feeling of Stress : Only a little   Social Connections: Socially Integrated (1/3/2024)    Social  Connection and Isolation Panel [NHANES]     Frequency of Communication with Friends and Family: Three times a week     Frequency of Social Gatherings with Friends and Family: Three times a week     Attends Sabianism Services: More than 4 times per year     Active Member of Clubs or Organizations: Yes     Attends Club or Organization Meetings: More than 4 times per year     Marital Status:    Housing Stability: Unknown (1/3/2024)    Housing Stability Vital Sign     Unable to Pay for Housing in the Last Year: No     Unstable Housing in the Last Year: No        Current Outpatient Medications   Medication Instructions    ascorbic acid (vitamin C) (VITAMIN C) 1,000 mg, Oral, Daily    ascorbic acid-multivit-min (EMERGEN-C) 1,000 mg PwEP Oral    calcium carbonate (OS-NELIA) 600 mg calcium (1,500 mg) Tab Oral, Daily    calcium carbonate/vitamin D3 (VITAMIN D-3 ORAL) 1,000 mg, Oral    cyanocobalamin (VITAMIN B-12) 100 MCG tablet Oral, Daily    FARXIGA 10 mg, Oral, Daily    FLAXSEED ORAL Oral    glimepiride (AMARYL) 1 mg, Oral, 2 times daily after meals    irbesartan-hydrochlorothiazide (AVALIDE) 150-12.5 mg per tablet 0.5 tablets, Oral, Every morning    Lactobac no.41/Bifidobact no.7 (PROBIOTIC-10 ORAL) 10 mg, Oral, Daily    metFORMIN (GLUCOPHAGE-XR) 500 mg, Oral, 2 times daily with meals    omega 3-dha-epa-fish oil (FISH OIL) 1,000 mg (120 mg-180 mg) Cap 1 capsule, Oral, Daily    pitavastatin calcium (LIVALO) 2 mg, Oral, Nightly    psyllium husk (METAMUCIL) 3.4 gram/5.4 gram Powd Oral, Daily    pyridoxine (vitamin B6) (VITAMIN B-6) 250 mg, Oral, Daily    RYBELSUS 7 mg, Oral, Daily    selenium 200 mcg, Oral, Daily    turmeric root extract 500 mg Cap Oral    vitamin E 400 Units, Oral, Daily    ZINC ACETATE ORAL Oral       Review of patient's allergies indicates:   Allergen Reactions    Statins-hmg-coa reductase inhibitors     Trulicity [dulaglutide]         Immunization History   Administered Date(s) Administered     "Influenza - Quadrivalent - PF *Preferred* (6 months and older) 11/02/2023    Zoster Recombinant 02/15/2022, 05/02/2022       Patient Care Team:  Isaac Hernandez Sr., MD as PCP - General (Family Medicine)  Jae Isbell MD as Consulting Physician (Gastroenterology)  Warner Lopez MD (Ophthalmology)  Aixa Manjarrez DPM as Consulting Physician (Podiatry)     Subjective:     Review of Systems    10 point review of systems conducted, negative except as stated in the history of present illness. See HPI for details.    Objective:     Visit Vitals  /75 (BP Location: Left arm, Patient Position: Sitting, BP Method: Large (Manual))   Pulse 92   Temp 98.3 °F (36.8 °C)   Resp 17   Ht 5' 10" (1.778 m)   Wt (!) 142 kg (313 lb)   SpO2 95%   BMI 44.91 kg/m²       Physical Exam  Constitutional:       Appearance: He is obese.   HENT:      Head: Normocephalic and atraumatic.   Cardiovascular:      Rate and Rhythm: Normal rate and regular rhythm.   Pulmonary:      Effort: Pulmonary effort is normal.      Breath sounds: Normal breath sounds.   Abdominal:      Palpations: Abdomen is soft.      Tenderness: There is no abdominal tenderness.   Musculoskeletal:         General: No swelling or tenderness. Normal range of motion.      Cervical back: Normal range of motion and neck supple.      Right lower leg: No edema.      Left lower leg: No edema.   Lymphadenopathy:      Cervical: No cervical adenopathy.   Skin:     General: Skin is warm and dry.   Neurological:      General: No focal deficit present.      Mental Status: He is alert and oriented to person, place, and time.   Psychiatric:         Mood and Affect: Mood normal.         Assessment:       ICD-10-CM ICD-9-CM   1. Type 2 diabetes mellitus without complication, without long-term current use of insulin  E11.9 250.00   2. Primary hypertension  I10 401.9   3. Mixed hyperlipidemia  E78.2 272.2   4. Class 3 severe obesity due to excess calories with serious " comorbidity and body mass index (BMI) of 40.0 to 44.9 in adult  E66.01 278.01    Z68.41 V85.41       Plan:   1. Type 2 diabetes mellitus without complication, without long-term current use of insulin  Overview:  Diabetes Medications      Diabetes Medications               dapagliflozin propanediol (FARXIGA) 10 mg tablet Take 1 tablet (10 mg total) by mouth once daily.    semaglutide (RYBELSUS) 7 mg tablet Take 1 tablet (7 mg total) by mouth once daily.    glimepiride (AMARYL) 1 MG tablet Take 1 tablet (1 mg total) by mouth 2 times daily 2 hours after meal.    metFORMIN (GLUCOPHAGE-XR) 500 MG ER 24hr tablet Take 1 tablet (500 mg total) by mouth 2 (two) times daily with meals.        Follow ADA Diet. Avoid soda, simple sweets, and limit rice/pasta/breads/starches (no more than 45-50 grams per meal).  Maintain healthy weight with goal BMI <30.  Exercise 5 times per week for 30 minutes per day.  Stressed importance of daily foot exams especially if neuropathy is present.  Patient was instructed to notify physician if they notice any sores or skin lesions on the feet.  Stressed the importance of wearing proper fitting comfortable shoes i.e. diabetic footwear.  They were instructed to always wear shoes and never go barefooted.  Stressed importance of annual dilated eye exam.    Assessment & Plan:  Lab Results   Component Value Date    HGBA1C 7.5 (H) 12/18/2023   Patient is not at goal.  Instructed patient to follow Diabetic diet.  Had a lengthy discussion with the patient regarding his dietary choices.  If his A1c does not come down close to 7.0 at his next visit, we will increase Rybelsus to 14 mg daily.  Patient voiced a clear understanding regarding what we are trying to achieve.    Orders:  -     POCT URINE DIPSTICK WITHOUT MICROSCOPE  -     glimepiride (AMARYL) 1 MG tablet; Take 1 tablet (1 mg total) by mouth 2 times daily 2 hours after meal.  -     metFORMIN (GLUCOPHAGE-XR) 500 MG ER 24hr tablet; Take 1 tablet (500  mg total) by mouth 2 (two) times daily with meals.  Dispense: 180 tablet; Refill: 3  -     Hemoglobin A1C, POCT; Future; Expected date: 03/03/2024    2. Primary hypertension  Overview:  Hypertension Medications               irbesartan-hydrochlorothiazide (AVALIDE) 150-12.5 mg per tablet Take 0.5 tablets by mouth every morning.        Low Sodium Diet (DASH Diet - Less than 2 grams of sodium per day).  Monitor blood pressure daily and log. Report consistent numbers greater than 140/90.  Maintain healthy weight with goal BMI <30. Exercise 30 minutes per day, 5 days per week.    Assessment & Plan:  BP Readings from Last 1 Encounters:   01/03/24 135/75   Patient is at goal.       3. Mixed hyperlipidemia  Overview:  Hyperlipidemia Medications               lovastatin (MEVACOR) 10 MG tablet Take 10 mg by mouth.    omega 3-dha-epa-fish oil (FISH OIL) 1,000 mg (120 mg-180 mg) Cap Take 1 capsule by mouth once daily.    pitavastatin calcium (LIVALO) 1 mg Tab tablet Take 1 tablet (1 mg total) by mouth every evening.        Stressed importance of dietary modifications. Follow a low cholesterol, low saturated fat diet with less that 200mg of cholesterol a day.  Avoid fried foods and high saturated fats (high saturated fats less than 7% of calories).  Add Flax Seed/Fish Oil supplements to diet. Increase dietary fiber.  Regular exercise can reduce LDL and raise HDL. Stressed importance of physical activity 5 times per week for 30 minutes per day.     Assessment & Plan:  Lab Results   Component Value Date    TRIG 120 12/18/2023    TRIG 227 (A) 01/30/2023    HDL 59 12/18/2023    HDL 49 01/30/2023    TOTALCHOLEST 4.7 01/30/2023    LDLCALC 128 (H) 12/18/2023   Patient is not at goal.   D/C Lovastatin  Instructed patient to not miss doses of prescribed medication.  Will increase Livalo to 2 mg every other day for 10 days, then increase to 2 mg daily.    Orders:  -     pitavastatin calcium (LIVALO) 2 mg Tab tablet; Take 1 tablet (2 mg  total) by mouth every evening.  Dispense: 90 tablet; Refill: 3    4. Class 3 severe obesity due to excess calories with serious comorbidity and body mass index (BMI) of 40.0 to 44.9 in adult  Overview:  Body mass index is 44.91 kg/m².  Goal BMI <30.  Exercise 5 times a week for 30 minutes per day.  Avoid soda, simple sugars, excessive rice, potatoes or bread. Limit fast foods and fried foods.  Choose complex carbs in moderation (example: green vegetables, beans, oatmeal). Eat plenty of fresh fruits and vegetables with lean meats daily.  Do not skip meals. Eat a balanced portion size.  Avoid fad diets. Consider permanent healthy life style changes.           [x] Discussed lab findings with the patient.  [x] Discussed diet, exercise and if appropriate, weight loss.  [x] Instructions and information, including risks and benefits of prescribed medication(s) have been reviewed with the patient and patient verbalizes understanding. Questions have been answered to the patient's satisfaction.  [] Appropriate counseling has been given regarding anxiety and depressive issues that were discussed today.  [] Any lab drawn today will be reviewed by physician at the time it is received and appropriate recommendations bill be made and discussed with patient.     Follow up in about 2 months (around 3/3/2024) for Diabetes Follow up with HbA1c.   In addition to their scheduled follow up, the patient has also been instructed to follow up on as needed basis.     Future Appointments   Date Time Provider Department Center   3/11/2024 10:30 AM Isaac Hernandez Sr., MD [facility] Leonard Jb Bourgeois Sr, MD

## 2024-01-03 NOTE — LETTER
AUTHORIZATION FOR RELEASE OF   CONFIDENTIAL INFORMATION    Dear Dr. Lopez's Office,    We are seeing Herbert Ramirez, date of birth 1965, in the clinic at [facility]. Isaac Hernandez Sr., MD is the patient's PCP. Herbert Ramirez has an outstanding lab/procedure at the time we reviewed his chart. In order to help keep his health information updated, he has authorized us to request the following medical record(s):        (  )  MAMMOGRAM                                      (  )  COLONOSCOPY      (  )  PAP SMEAR                                          (  )  OUTSIDE LAB RESULTS     (  )  DEXA SCAN                                          ( X )  EYE EXAM            (  )  FOOT EXAM                                          (  )  ENTIRE RECORD     (  )  OUTSIDE IMMUNIZATIONS                 (  )  _______________         Please fax records to [facility], Bourgeois, Leonard Jb. Sr., MD, [phone]     If you have any questions, please contact our office at [phone].           Patient Name: Herbert Ramirez  : 1965  Patient Phone #: [phone]

## 2024-01-03 NOTE — ASSESSMENT & PLAN NOTE
Lab Results   Component Value Date    HGBA1C 7.5 (H) 12/18/2023   Patient is not at goal.  Instructed patient to follow Diabetic diet.  Had a lengthy discussion with the patient regarding his dietary choices.  If his A1c does not come down close to 7.0 at his next visit, we will increase Rybelsus to 14 mg daily.  Patient voiced a clear understanding regarding what we are trying to achieve.  Also, eventually I would like to get him off of glimepiride if it all possible.

## 2024-01-03 NOTE — ASSESSMENT & PLAN NOTE
Lab Results   Component Value Date    TRIG 120 12/18/2023    TRIG 227 (A) 01/30/2023    HDL 59 12/18/2023    HDL 49 01/30/2023    TOTALCHOLEST 4.7 01/30/2023    LDLCALC 128 (H) 12/18/2023   Patient is not at goal.   D/C Lovastatin  Instructed patient to not miss doses of prescribed medication.  Will increase Livalo to 2 mg every other day for 10 days, then increase to 2 mg daily.

## 2024-01-19 ENCOUNTER — DOCUMENTATION ONLY (OUTPATIENT)
Dept: FAMILY MEDICINE | Facility: CLINIC | Age: 59
End: 2024-01-19
Payer: COMMERCIAL

## 2024-02-14 ENCOUNTER — TELEPHONE (OUTPATIENT)
Dept: FAMILY MEDICINE | Facility: CLINIC | Age: 59
End: 2024-02-14
Payer: COMMERCIAL

## 2024-02-14 DIAGNOSIS — R05.9 COUGH, UNSPECIFIED TYPE: Primary | ICD-10-CM

## 2024-02-14 RX ORDER — AZITHROMYCIN 250 MG/1
TABLET, FILM COATED ORAL
Qty: 6 TABLET | Refills: 0 | Status: SHIPPED | OUTPATIENT
Start: 2024-02-14 | End: 2024-02-19

## 2024-02-14 NOTE — TELEPHONE ENCOUNTER
----- Message from Angle Velarde sent at 2/14/2024  8:49 AM CST -----  HE HAS A LITTLE COUGH, LITTLE HOARSE, NO FEVER, CAN HE HAVE SOMETHING? DELANES OR CAN HE COME IN AND GET A SHOT?

## 2024-02-14 NOTE — TELEPHONE ENCOUNTER
Pt. Called and notified of rx called in at [facility] per verbal order Dr. Hernandez.

## 2024-02-26 DIAGNOSIS — E11.9 TYPE 2 DIABETES MELLITUS WITHOUT COMPLICATION, WITHOUT LONG-TERM CURRENT USE OF INSULIN: ICD-10-CM

## 2024-02-26 RX ORDER — DAPAGLIFLOZIN 10 MG/1
10 TABLET, FILM COATED ORAL DAILY
Qty: 90 TABLET | Refills: 1 | Status: SHIPPED | OUTPATIENT
Start: 2024-02-26 | End: 2024-04-03 | Stop reason: SDUPTHER

## 2024-03-05 ENCOUNTER — DOCUMENTATION ONLY (OUTPATIENT)
Dept: ADMINISTRATIVE | Facility: HOSPITAL | Age: 59
End: 2024-03-05
Payer: COMMERCIAL

## 2024-04-03 DIAGNOSIS — E11.9 TYPE 2 DIABETES MELLITUS WITHOUT COMPLICATION, WITHOUT LONG-TERM CURRENT USE OF INSULIN: ICD-10-CM

## 2024-04-04 RX ORDER — DAPAGLIFLOZIN 10 MG/1
10 TABLET, FILM COATED ORAL DAILY
Qty: 90 TABLET | Refills: 1 | Status: SHIPPED | OUTPATIENT
Start: 2024-04-04

## 2024-04-04 RX ORDER — ORAL SEMAGLUTIDE 7 MG/1
7 TABLET ORAL DAILY
Qty: 90 TABLET | Refills: 1 | Status: SHIPPED | OUTPATIENT
Start: 2024-04-04

## 2024-04-04 NOTE — TELEPHONE ENCOUNTER
Refill sent to [facility] . Patient pushed back appointment due to working 46 hours a day.

## 2024-05-30 ENCOUNTER — TELEPHONE (OUTPATIENT)
Dept: FAMILY MEDICINE | Facility: CLINIC | Age: 59
End: 2024-05-30
Payer: COMMERCIAL

## 2024-05-30 NOTE — TELEPHONE ENCOUNTER
----- Message from Angle Velarde sent at 5/30/2024  1:25 PM CDT -----  His nose is plugged up, talking nasally and is starting to get a sore throat.  Can something be called in to [facility]

## 2024-07-09 RX ORDER — ROSUVASTATIN CALCIUM 20 MG/1
20 TABLET, COATED ORAL DAILY
Qty: 90 TABLET | Refills: 3 | Status: CANCELLED | OUTPATIENT
Start: 2024-07-09 | End: 2025-07-09

## 2024-07-09 NOTE — ASSESSMENT & PLAN NOTE
Lab Results   Component Value Date    HGBA1C 7.4 (H) 07/01/2024     Increase metformin to 1000 mg twice daily  Stop glimepiride  Continue Farxiga  Keep taking Rybelsus 7 mg daily until an injectable is approved  Once an injectable is approved, stop taking Rybelsus  Make sure your sugars every morning or 130 or below  If your sugars in the morning or below 115-120, you can stop the Farxiga    Call us in one-month to tell us whether or not you are having side-effects of your injectable.  If not we will increase the dose.  You will call us after the 2nd month as well.  For the 3rd month you will come in and you will have an HbA1c done to retest her sugars

## 2024-07-09 NOTE — PROGRESS NOTES
Family Medicine    Patient ID: [id number]     Chief Complaint: Results (No concerns/)      HPI:     Herbert Ramirez is a 58 y.o. male here today for a follow up.     Past Medical History:   Diagnosis Date    Albuminuria     Elevated LFTs     Fatty liver     History of cataract     Hyperlipidemia     Hypertension     Obesity, unspecified     RAYA (obstructive sleep apnea)     Seasonal allergies     Type 2 diabetes mellitus without complications     Umbilical hernia     Venous stasis         Past Surgical History:   Procedure Laterality Date    CATARACT EXTRACTION Right     COLONOSCOPY  08/24/2014    Dr. Isbell - f/u 10 years    NOSE SURGERY      UMBILICAL HERNIA REPAIR          Social History     Tobacco Use    Smoking status: Never    Smokeless tobacco: Never   Substance and Sexual Activity    Alcohol use: Not Currently    Drug use: Never    Sexual activity: Yes        Current Outpatient Medications   Medication Instructions    ascorbic acid (vitamin C) (VITAMIN C) 1,000 mg, Oral, Daily    ascorbic acid-multivit-min (EMERGEN-C) 1,000 mg PwEP Oral    calcium carbonate (OS-NELIA) 600 mg calcium (1,500 mg) Tab Oral, Daily    calcium carbonate/vitamin D3 (VITAMIN D-3 ORAL) 1,000 mg, Oral    cyanocobalamin (VITAMIN B-12) 100 MCG tablet Oral, Daily    dapagliflozin propanediol (FARXIGA) 10 mg, Oral, Daily    ezetimibe (ZETIA) 10 mg, Oral, Daily    FLAXSEED ORAL Oral    glimepiride (AMARYL) 1 mg, Oral, 2 times daily after meals    irbesartan-hydrochlorothiazide (AVALIDE) 150-12.5 mg per tablet 0.5 tablets, Oral, Every morning    Lactobac no.41/Bifidobact no.7 (PROBIOTIC-10 ORAL) 10 mg, Oral, Daily    metFORMIN (GLUMETZA) 1,000 mg, Oral, 2 times daily with meals    MOUNJARO 2.5 mg, Subcutaneous, Every 7 days    omega 3-dha-epa-fish oil (FISH OIL) 1,000 mg (120 mg-180 mg) Cap 1 capsule, Oral, Daily    pitavastatin calcium (LIVALO) 4 mg, Oral, Daily    psyllium husk (METAMUCIL) 3.4 gram/5.4 gram Powd Oral, Daily    pyridoxine  "(vitamin B6) (VITAMIN B-6) 250 mg, Oral, Daily    selenium 200 mcg, Oral, Daily    turmeric root extract 500 mg Cap Oral    vitamin E 400 Units, Oral, Daily    ZINC ACETATE ORAL Oral       Review of patient's allergies indicates:   Allergen Reactions    Statins-hmg-coa reductase inhibitors     Trulicity [dulaglutide]         Patient Care Team:  Isaac Hernandez Sr., MD as PCP - General (Family Medicine)  Jae Isbell MD as Consulting Physician (Gastroenterology)  Warner Lopez MD (Ophthalmology)  Aixa Manjarrez DPM as Consulting Physician (Podiatry)     Subjective:     Review of Systems   Constitutional:  Negative for activity change, appetite change, fever and unexpected weight change.   HENT:  Negative for congestion, dental problem, rhinorrhea and trouble swallowing.    Eyes:  Negative for visual disturbance.   Respiratory:  Negative for chest tightness and shortness of breath.    Cardiovascular:  Negative for chest pain, palpitations and leg swelling.   Gastrointestinal:  Negative for abdominal pain, blood in stool, constipation, diarrhea, nausea and vomiting.   Genitourinary:  Negative for difficulty urinating.   Musculoskeletal:  Negative for gait problem.   Skin:  Negative for rash and wound.   Neurological:  Negative for dizziness, syncope, speech difficulty, weakness and light-headedness.   Psychiatric/Behavioral:  Negative for confusion and suicidal ideas.        12 point review of systems conducted, negative except as stated in the history of present illness. See HPI for details.    Objective:     Visit Vitals  /82   Pulse 75   Temp 98 °F (36.7 °C)   Resp 20   Ht 5' 10" (1.778 m)   Wt (!) 142.9 kg (315 lb)   SpO2 97%   BMI 45.20 kg/m²       Physical Exam  Vitals and nursing note reviewed.   Constitutional:       General: He is not in acute distress.     Appearance: Normal appearance. He is not ill-appearing, toxic-appearing or diaphoretic.   HENT:      Head: Normocephalic and " atraumatic.      Right Ear: Tympanic membrane, ear canal and external ear normal. There is no impacted cerumen.      Left Ear: Tympanic membrane, ear canal and external ear normal. There is no impacted cerumen.      Nose: No congestion or rhinorrhea.      Mouth/Throat:      Pharynx: Oropharynx is clear. No oropharyngeal exudate or posterior oropharyngeal erythema.   Eyes:      General:         Right eye: No discharge.         Left eye: No discharge.      Extraocular Movements: Extraocular movements intact.      Conjunctiva/sclera: Conjunctivae normal.   Cardiovascular:      Rate and Rhythm: Normal rate and regular rhythm.      Heart sounds: No murmur heard.     No friction rub. No gallop.   Pulmonary:      Effort: Pulmonary effort is normal. No respiratory distress.      Breath sounds: Normal breath sounds.   Musculoskeletal:      Right lower leg: No edema.      Left lower leg: No edema.   Skin:     Capillary Refill: Capillary refill takes less than 2 seconds.   Neurological:      Mental Status: He is alert and oriented to person, place, and time.   Psychiatric:         Mood and Affect: Mood normal.         Behavior: Behavior normal.         Thought Content: Thought content normal.         Labs Reviewed:     Chemistry:  Lab Results   Component Value Date     07/01/2024    K 4.5 07/01/2024    BUN 19 07/01/2024    CREATININE 0.73 (L) 07/01/2024    EGFRNORACEVR 105 07/01/2024    CALCIUM 10.2 07/01/2024    ALBUMIN 4.4 07/01/2024    BILIDIR <0.20 12/18/2023    AST 22 07/01/2024    ALT 20 07/01/2024    [id number] 44.5 07/01/2024    TSH 1.870 07/01/2024        Lab Results   Component Value Date    HGBA1C 7.4 (H) 07/01/2024        Hematology:  Lab Results   Component Value Date    WBC 5.1 07/01/2024    HGB 16.3 07/01/2024    HCT 48.7 07/01/2024     07/01/2024       Lipid Panel:  Lab Results   Component Value Date    CHOL 197 07/01/2024    HDL 58 07/01/2024    TRIG 129 07/01/2024    TOTALCHOLEST 4.7 01/30/2023  "       Urine:  No results found for: "COLORUA", "APPEARANCEUA", "SGUA", "PHUA", "PROTEINUA", "GLUCOSEUA", "KETONESUA", "BLOODUA", "NITRITESUA", "LEUKOCYTESUR", "RBCUA", "WBCUA", "BACTERIA", "SQEPUA", "HYALINECASTS", "CREATRANDUR", "PROTEINURINE", "UPROTCREA"     Assessment:       ICD-10-CM ICD-9-CM   1. Type 2 diabetes mellitus without complication, without long-term current use of insulin  E11.9 250.00   2. Mixed hyperlipidemia  E78.2 272.2   3. Primary hypertension  I10 401.9        Plan:     1. Type 2 diabetes mellitus without complication, without long-term current use of insulin  Overview:        dapagliflozin propanediol (FARXIGA) 10 mg tablet Take 1 tablet (10 mg total) by mouth once daily.    semaglutide (RYBELSUS) 7 mg tablet Take 1 tablet (7 mg total) by mouth once daily.    glimepiride (AMARYL) 1 MG tablet Take 1 tablet (1 mg total) by mouth 2 times daily 2 hours after meal.    metFORMIN (GLUCOPHAGE-XR) 500 MG ER 24hr tablet Take 1 tablet (500 mg total) by mouth 2 (two) times daily with meals.     A1c improved from 7.5, to 7.4 over 6 months, negligible  Had discussion with PCP 6 months ago to increased Rybelsus to 14 mg if his A1c was not close to 7.0      Assessment & Plan:  Lab Results   Component Value Date    HGBA1C 7.4 (H) 07/01/2024     Increase metformin to 1000 mg twice daily  Stop glimepiride  Continue Farxiga  Keep taking Rybelsus 7 mg daily until an injectable is approved  Once an injectable is approved, stop taking Rybelsus  Make sure your sugars every morning or 130 or below  If your sugars in the morning or below 115-120, you can stop the Farxiga    Call us in one-month to tell us whether or not you are having side-effects of your injectable.  If not we will increase the dose.  You will call us after the 2nd month as well.  For the 3rd month you will come in and you will have an HbA1c done to retest her sugars      2. Mixed hyperlipidemia  Overview:  Hyperlipidemia Medications                "     omega 3-dha-epa-fish oil (FISH OIL) 1,000 mg (120 mg-180 mg) Cap Take 1 capsule by mouth once daily.    pitavastatin calcium (LIVALO) 2 mg Tab tablet Take 1 tablet (1 mg total) by mouth every evening.     Lovastatin discontinued last visit  Patient is diabetic  Diabetic LDL-C goal: Below 70  Current LDL-C:  116  Not at goal       Assessment & Plan:  Lab Results   Component Value Date    TRIG 129 07/01/2024    TRIG 227 (A) 01/30/2023    HDL 58 07/01/2024    HDL 49 01/30/2023    TOTALCHOLEST 4.7 01/30/2023    LDLCALC 116 (H) 07/01/2024     Increase Livalo to 4 mg daily  Start taking Zetia 10 mg once daily  The combination of the above medication changes should get his LDL to about 80, close to goal  If worse sufficiently far away from goal in 3 months we will try Crestor instead of Livalo because he does not recall if he has tried that yet    Orders:  -     Apolipoprotein B; Future; Expected date: 10/10/2024    3. Primary hypertension  Overview:  Hypertension Medications               irbesartan-hydrochlorothiazide (AVALIDE) 150-12.5 mg per tablet Take 0.5 tablets by mouth every morning.        Low Sodium Diet (DASH Diet - Less than 2 grams of sodium per day).  Monitor blood pressure daily and log. Report consistent numbers greater than 140/90.  Maintain healthy weight with goal BMI <30. Exercise 30 minutes per day, 5 days per week.    Assessment & Plan:  BP Readings from Last 1 Encounters:   07/10/24 136/82   Patient is at goal.   Continue above medication at same dose      Other orders  -     ezetimibe (ZETIA) 10 mg tablet; Take 1 tablet (10 mg total) by mouth once daily.  Dispense: 90 tablet; Refill: 3  -     pitavastatin calcium (LIVALO) 4 mg Tab; Take 4 mg by mouth once daily.  Dispense: 90 tablet; Refill: 3  -     tirzepatide (MOUNJARO) 2.5 mg/0.5 mL PnIj; Inject 2.5 mg into the skin every 7 days.  Dispense: 4 Pen; Refill: 2  -     metFORMIN (GLUMETZA) 1000 MG (MOD) 24hr tablet; Take 1 tablet (1,000 mg  total) by mouth 2 (two) times daily with meals.  Dispense: 180 tablet; Refill: 3         Follow up in about 3 months (around 10/10/2024) for DM, HLD. In addition to their scheduled follow up, the patient has also been instructed to follow up on as needed basis.     Future Appointments   Date Time Provider Department Center   10/14/2024  3:00 PM PROVIDER, [facility] AMBROSIO Pierre MD

## 2024-07-09 NOTE — ASSESSMENT & PLAN NOTE
Lab Results   Component Value Date    TRIG 129 07/01/2024    TRIG 227 (A) 01/30/2023    HDL 58 07/01/2024    HDL 49 01/30/2023    TOTALCHOLEST 4.7 01/30/2023    LDLCALC 116 (H) 07/01/2024     Increase Livalo to 4 mg daily  Start taking Zetia 10 mg once daily  The combination of the above medication changes should get his LDL to about 80, close to goal  If worse sufficiently far away from goal in 3 months we will try Crestor instead of Livalo because he does not recall if he has tried that yet

## 2024-07-10 ENCOUNTER — OFFICE VISIT (OUTPATIENT)
Dept: FAMILY MEDICINE | Facility: CLINIC | Age: 59
End: 2024-07-10
Payer: COMMERCIAL

## 2024-07-10 VITALS
BODY MASS INDEX: 45.1 KG/M2 | HEIGHT: 70 IN | DIASTOLIC BLOOD PRESSURE: 82 MMHG | TEMPERATURE: 98 F | OXYGEN SATURATION: 97 % | SYSTOLIC BLOOD PRESSURE: 136 MMHG | WEIGHT: 315 LBS | RESPIRATION RATE: 20 BRPM | HEART RATE: 75 BPM

## 2024-07-10 DIAGNOSIS — E11.9 TYPE 2 DIABETES MELLITUS WITHOUT COMPLICATION, WITHOUT LONG-TERM CURRENT USE OF INSULIN: Primary | ICD-10-CM

## 2024-07-10 DIAGNOSIS — I10 PRIMARY HYPERTENSION: ICD-10-CM

## 2024-07-10 DIAGNOSIS — E78.2 MIXED HYPERLIPIDEMIA: ICD-10-CM

## 2024-07-10 PROCEDURE — 3008F BODY MASS INDEX DOCD: CPT | Mod: CPTII,,, | Performed by: FAMILY MEDICINE

## 2024-07-10 PROCEDURE — 3079F DIAST BP 80-89 MM HG: CPT | Mod: CPTII,,, | Performed by: FAMILY MEDICINE

## 2024-07-10 PROCEDURE — 99214 OFFICE O/P EST MOD 30 MIN: CPT | Mod: ,,, | Performed by: FAMILY MEDICINE

## 2024-07-10 PROCEDURE — 3075F SYST BP GE 130 - 139MM HG: CPT | Mod: CPTII,,, | Performed by: FAMILY MEDICINE

## 2024-07-10 PROCEDURE — 1159F MED LIST DOCD IN RCRD: CPT | Mod: CPTII,,, | Performed by: FAMILY MEDICINE

## 2024-07-10 PROCEDURE — 3051F HG A1C>EQUAL 7.0%<8.0%: CPT | Mod: CPTII,,, | Performed by: FAMILY MEDICINE

## 2024-07-10 RX ORDER — TIRZEPATIDE 2.5 MG/.5ML
2.5 INJECTION, SOLUTION SUBCUTANEOUS
Qty: 4 PEN | Refills: 2 | Status: SHIPPED | OUTPATIENT
Start: 2024-07-10

## 2024-07-10 RX ORDER — PITAVASTATIN CALCIUM 4.18 MG/1
4 TABLET, FILM COATED ORAL DAILY
Qty: 90 TABLET | Refills: 3 | Status: SHIPPED | OUTPATIENT
Start: 2024-07-10

## 2024-07-10 RX ORDER — EZETIMIBE 10 MG/1
10 TABLET ORAL DAILY
Qty: 90 TABLET | Refills: 3 | Status: SHIPPED | OUTPATIENT
Start: 2024-07-10 | End: 2025-07-10

## 2024-07-10 RX ORDER — METFORMIN HYDROCHLORIDE 1000 MG/1
1000 TABLET, FILM COATED, EXTENDED RELEASE ORAL 2 TIMES DAILY WITH MEALS
Qty: 180 TABLET | Refills: 3 | Status: SHIPPED | OUTPATIENT
Start: 2024-07-10 | End: 2025-07-10

## 2024-07-10 NOTE — ASSESSMENT & PLAN NOTE
BP Readings from Last 1 Encounters:   07/10/24 136/82   Patient is at goal.   Continue above medication at same dose

## 2024-07-10 NOTE — PATIENT INSTRUCTIONS
Increase metformin to 1000 mg twice daily  Stop glimepiride  Continue Farxiga  Keep taking Rybelsus 7 mg daily until an injectable is approved  Once an injectable is approved, stop taking Rybelsus  Make sure your sugars every morning or 130 or below  If your sugars in the morning or below 115-120, you can stop the Farxiga    Increase Livalo to 4 mg daily  Start taking Zetia 10 mg once daily    Call us in one-month to tell us whether or not you are having side-effects of your injectable.  If not we will increase the dose.  You will call us after the 2nd month as well.  For the 3rd month you will come in and you will have an HbA1c done to retest her sugars

## 2024-07-15 ENCOUNTER — TELEPHONE (OUTPATIENT)
Dept: FAMILY MEDICINE | Facility: CLINIC | Age: 59
End: 2024-07-15
Payer: COMMERCIAL

## 2024-07-15 NOTE — TELEPHONE ENCOUNTER
Herbert mccain and his insurance will not cover the Metformin 1000 mg 1 Bid, it will cover the 500 mg (Glucophage). Also won   'It wont cover the Livalo 4 mg 1 daily , but will cover the 2 mg BID. Please advise.

## 2024-07-17 DIAGNOSIS — E78.2 MIXED HYPERLIPIDEMIA: Primary | ICD-10-CM

## 2024-07-17 DIAGNOSIS — E11.9 TYPE 2 DIABETES MELLITUS WITHOUT COMPLICATION, WITHOUT LONG-TERM CURRENT USE OF INSULIN: ICD-10-CM

## 2024-07-17 RX ORDER — PITAVASTATIN CALCIUM 2.09 MG/1
2 TABLET, FILM COATED ORAL 2 TIMES DAILY
Qty: 180 TABLET | Refills: 3 | Status: SHIPPED | OUTPATIENT
Start: 2024-07-17 | End: 2025-07-17

## 2024-07-17 RX ORDER — METFORMIN HYDROCHLORIDE 500 MG/1
1000 TABLET, EXTENDED RELEASE ORAL 2 TIMES DAILY WITH MEALS
Qty: 360 TABLET | Refills: 3 | Status: SHIPPED | OUTPATIENT
Start: 2024-07-17 | End: 2025-07-17

## 2024-08-07 ENCOUNTER — TELEPHONE (OUTPATIENT)
Dept: FAMILY MEDICINE | Facility: CLINIC | Age: 59
End: 2024-08-07
Payer: COMMERCIAL

## 2024-08-26 LAB — CRC RECOMMENDATION EXT: NORMAL

## 2024-08-28 ENCOUNTER — DOCUMENTATION ONLY (OUTPATIENT)
Dept: FAMILY MEDICINE | Facility: CLINIC | Age: 59
End: 2024-08-28
Payer: COMMERCIAL

## 2024-09-05 DIAGNOSIS — E11.9 TYPE 2 DIABETES MELLITUS WITHOUT COMPLICATION, WITHOUT LONG-TERM CURRENT USE OF INSULIN: ICD-10-CM

## 2024-09-05 RX ORDER — DAPAGLIFLOZIN 10 MG/1
10 TABLET, FILM COATED ORAL DAILY
Qty: 90 TABLET | Refills: 2 | Status: SHIPPED | OUTPATIENT
Start: 2024-09-05

## 2024-09-25 LAB
LEFT EYE DM RETINOPATHY: NEGATIVE
RIGHT EYE DM RETINOPATHY: NEGATIVE

## 2024-10-03 ENCOUNTER — DOCUMENTATION ONLY (OUTPATIENT)
Dept: FAMILY MEDICINE | Facility: CLINIC | Age: 59
End: 2024-10-03
Payer: COMMERCIAL

## 2024-10-07 LAB
LEFT EYE DM RETINOPATHY: NEGATIVE
RIGHT EYE DM RETINOPATHY: NEGATIVE

## 2024-10-14 ENCOUNTER — OFFICE VISIT (OUTPATIENT)
Dept: FAMILY MEDICINE | Facility: CLINIC | Age: 59
End: 2024-10-14
Payer: COMMERCIAL

## 2024-10-14 VITALS
WEIGHT: 305.19 LBS | SYSTOLIC BLOOD PRESSURE: 119 MMHG | DIASTOLIC BLOOD PRESSURE: 66 MMHG | TEMPERATURE: 98 F | BODY MASS INDEX: 43.69 KG/M2 | HEART RATE: 68 BPM | OXYGEN SATURATION: 97 % | HEIGHT: 70 IN | RESPIRATION RATE: 18 BRPM

## 2024-10-14 DIAGNOSIS — E11.9 TYPE 2 DIABETES MELLITUS WITHOUT COMPLICATION, WITHOUT LONG-TERM CURRENT USE OF INSULIN: Primary | ICD-10-CM

## 2024-10-14 DIAGNOSIS — I10 PRIMARY HYPERTENSION: ICD-10-CM

## 2024-10-14 LAB — HBA1C MFR BLD: 7.9 %

## 2024-10-14 PROCEDURE — 3078F DIAST BP <80 MM HG: CPT | Mod: CPTII,,, | Performed by: FAMILY MEDICINE

## 2024-10-14 PROCEDURE — 3051F HG A1C>EQUAL 7.0%<8.0%: CPT | Mod: CPTII,,, | Performed by: FAMILY MEDICINE

## 2024-10-14 PROCEDURE — 1160F RVW MEDS BY RX/DR IN RCRD: CPT | Mod: CPTII,,, | Performed by: FAMILY MEDICINE

## 2024-10-14 PROCEDURE — 1159F MED LIST DOCD IN RCRD: CPT | Mod: CPTII,,, | Performed by: FAMILY MEDICINE

## 2024-10-14 PROCEDURE — 2023F DILAT RTA XM W/O RTNOPTHY: CPT | Mod: CPTII,,, | Performed by: FAMILY MEDICINE

## 2024-10-14 PROCEDURE — 3074F SYST BP LT 130 MM HG: CPT | Mod: CPTII,,, | Performed by: FAMILY MEDICINE

## 2024-10-14 PROCEDURE — 99213 OFFICE O/P EST LOW 20 MIN: CPT | Mod: ,,, | Performed by: FAMILY MEDICINE

## 2024-10-14 PROCEDURE — 3008F BODY MASS INDEX DOCD: CPT | Mod: CPTII,,, | Performed by: FAMILY MEDICINE

## 2024-10-14 RX ORDER — TIRZEPATIDE 2.5 MG/.5ML
2.5 INJECTION, SOLUTION SUBCUTANEOUS
Qty: 4 PEN | Refills: 2 | Status: SHIPPED | OUTPATIENT
Start: 2024-10-14

## 2024-10-14 RX ORDER — IRBESARTAN AND HYDROCHLOROTHIAZIDE 150; 12.5 MG/1; MG/1
0.5 TABLET, FILM COATED ORAL EVERY MORNING
Qty: 90 TABLET | Refills: 3 | Status: SHIPPED | OUTPATIENT
Start: 2024-10-14

## 2024-10-14 NOTE — PROGRESS NOTES
Family Medicine    Patient ID: [id number]     Chief Complaint: Follow-up (3 month fu on HLD and DM)      HPI:     Herbert Ramirez is a 59 y.o. male here today for a follow up.     Past Medical History:   Diagnosis Date    Albuminuria     Elevated LFTs     Fatty liver     History of cataract     Hyperlipidemia     Hypertension     Obesity, unspecified     RAYA (obstructive sleep apnea)     Seasonal allergies     Type 2 diabetes mellitus without complications     Umbilical hernia     Venous stasis         Past Surgical History:   Procedure Laterality Date    CATARACT EXTRACTION Right     COLONOSCOPY  08/24/2014    Dr. Isbell - f/u 10 years    NOSE SURGERY      UMBILICAL HERNIA REPAIR          Social History     Tobacco Use    Smoking status: Never    Smokeless tobacco: Never   Substance and Sexual Activity    Alcohol use: Not Currently    Drug use: Never    Sexual activity: Yes        Current Outpatient Medications   Medication Instructions    ascorbic acid (vitamin C) (VITAMIN C) 1,000 mg, Daily    ascorbic acid-multivit-min (EMERGEN-C) 1,000 mg PwEP Take by mouth.    BERBERINE CHLORIDE ORAL Take by mouth.    bergamot extract (CITRUS BERGAMOT ORAL) Take by mouth.    calcium carbonate (OS-NELIA) 600 mg calcium (1,500 mg) Tab Daily    calcium carbonate/vitamin D3 (VITAMIN D-3 ORAL) 1,000 mg    cholecalciferol, vitD3,/vit K2 (VITAMIN D3-VITAMIN K2 ORAL) Daily    cyanocobalamin (VITAMIN B-12) 100 MCG tablet Daily    dapagliflozin propanediol (FARXIGA) 10 mg, Oral, Daily    ezetimibe (ZETIA) 10 mg, Oral, Daily    FLAXSEED ORAL Take by mouth.    glimepiride (AMARYL) 1 mg, Oral, 2 times daily after meals    irbesartan-hydrochlorothiazide (AVALIDE) 150-12.5 mg per tablet 0.5 tablets, Oral, Every morning    Lactobac no.41/Bifidobact no.7 (PROBIOTIC-10 ORAL) 10 mg, Daily    metFORMIN (GLUCOPHAGE-XR) 1,000 mg, Oral, 2 times daily with meals    MOUNJARO 2.5 mg, Subcutaneous, Every 7 days    omega 3-dha-epa-fish oil (FISH OIL) 1,000  mg (120 mg-180 mg) Cap 1 capsule, Daily    pitavastatin calcium (LIVALO) 2 mg, Oral, 2 times daily    psyllium husk (METAMUCIL) 3.4 gram/5.4 gram Powd Daily    pyridoxine (vitamin B6) (VITAMIN B-6) 250 mg, Daily    RESVERATROL ORAL Take by mouth.    selenium 200 mcg, Daily    turmeric root extract 500 mg Cap Take by mouth.    vitamin E 400 Units, Daily    ZINC ACETATE ORAL Take by mouth.       Review of patient's allergies indicates:   Allergen Reactions    Statins-hmg-coa reductase inhibitors     Trulicity [dulaglutide]         Patient Care Team:  Isaac Hernandez Sr., MD as PCP - General (Family Medicine)  Jae Isbell MD as Consulting Physician (Gastroenterology)  Warner Lopez MD (Ophthalmology)  Aixa Manjarrez DPM as Consulting Physician (Podiatry)     Subjective:     Review of Systems   Constitutional:  Negative for activity change, appetite change, fever and unexpected weight change.   HENT:  Negative for congestion, dental problem, hearing loss, rhinorrhea and trouble swallowing.    Eyes:  Negative for discharge and visual disturbance.   Respiratory:  Negative for chest tightness, shortness of breath and wheezing.    Cardiovascular:  Negative for chest pain, palpitations and leg swelling.   Gastrointestinal:  Negative for abdominal pain, blood in stool, constipation, diarrhea, nausea and vomiting.   Endocrine: Negative for polydipsia and polyuria.   Genitourinary:  Negative for difficulty urinating, hematuria and urgency.   Musculoskeletal:  Negative for arthralgias, gait problem, joint swelling and neck pain.   Skin:  Negative for rash and wound.   Neurological:  Negative for dizziness, syncope, speech difficulty, weakness, light-headedness and headaches.   Psychiatric/Behavioral:  Negative for confusion, dysphoric mood and suicidal ideas.        12 point review of systems conducted, negative except as stated in the history of present illness. See HPI for details.    Objective:     Visit  "Vitals  /66   Pulse 68   Temp 97.6 °F (36.4 °C)   Resp 18   Ht 5' 10" (1.778 m)   Wt (!) 138.4 kg (305 lb 3.2 oz)   SpO2 97%   BMI 43.79 kg/m²       Physical Exam  Vitals and nursing note reviewed.   Constitutional:       General: He is not in acute distress.     Appearance: Normal appearance. He is not ill-appearing, toxic-appearing or diaphoretic.   HENT:      Head: Normocephalic and atraumatic.      Right Ear: Tympanic membrane, ear canal and external ear normal. There is no impacted cerumen.      Left Ear: Tympanic membrane, ear canal and external ear normal. There is no impacted cerumen.      Nose: No congestion or rhinorrhea.      Mouth/Throat:      Pharynx: Oropharynx is clear. No oropharyngeal exudate or posterior oropharyngeal erythema.   Eyes:      General:         Right eye: No discharge.         Left eye: No discharge.      Extraocular Movements: Extraocular movements intact.      Conjunctiva/sclera: Conjunctivae normal.   Cardiovascular:      Rate and Rhythm: Normal rate and regular rhythm.      Heart sounds: No murmur heard.     No friction rub. No gallop.   Pulmonary:      Effort: Pulmonary effort is normal. No respiratory distress.      Breath sounds: Normal breath sounds.   Musculoskeletal:      Right lower leg: No edema.      Left lower leg: No edema.   Skin:     Capillary Refill: Capillary refill takes less than 2 seconds.   Neurological:      Mental Status: He is alert and oriented to person, place, and time.   Psychiatric:         Mood and Affect: Mood normal.         Behavior: Behavior normal.         Thought Content: Thought content normal.         Labs Reviewed:     Chemistry:  Lab Results   Component Value Date     07/01/2024    K 4.5 07/01/2024    BUN 19 07/01/2024    CREATININE 0.73 (L) 07/01/2024    EGFRNORACEVR 105 07/01/2024    CALCIUM 10.2 07/01/2024    ALBUMIN 4.4 07/01/2024    BILIDIR <0.20 12/18/2023    AST 22 07/01/2024    ALT 20 07/01/2024    [id number] 44.5 " "07/01/2024    TSH 1.870 07/01/2024        Lab Results   Component Value Date    HGBA1C 7.4 (H) 07/01/2024        Hematology:  Lab Results   Component Value Date    WBC 5.1 07/01/2024    HGB 16.3 07/01/2024    HCT 48.7 07/01/2024     07/01/2024       Lipid Panel:  Lab Results   Component Value Date    CHOL 197 07/01/2024    HDL 58 07/01/2024    TRIG 129 07/01/2024    TOTALCHOLEST 4.7 01/30/2023        Urine:  No results found for: "COLORUA", "APPEARANCEUA", "SGUA", "PHUA", "PROTEINUA", "GLUCOSEUA", "KETONESUA", "BLOODUA", "NITRITESUA", "LEUKOCYTESUR", "RBCUA", "WBCUA", "BACTERIA", "SQEPUA", "HYALINECASTS", "CREATRANDUR", "PROTEINURINE", "UPROTCREA"     Assessment:       ICD-10-CM ICD-9-CM   1. Type 2 diabetes mellitus without complication, without long-term current use of insulin  E11.9 250.00        Plan:     1. Type 2 diabetes mellitus without complication, without long-term current use of insulin  Overview:  Metformin 1000 mg b.i.d. (500 x2 b.i.d.)  Farxiga 10 mg daily  Rybelsus 7 mg daily    Glimepiride 1 mg b.i.d. stopped in anticipation of starting Mounjaro  Insurance would not pay for Mounjaro because patient refilled Rybelsus        Assessment & Plan:  Expected worsening of A1c today given discontinuance of glimepiride  We will resend Mounjaro 2.5  He will discontinue Rybelsus 7 mg daily  He will continue both Farxiga and metformin at the above doses  Restart glimepiride at above dose only if Mounjaro not approved  If Mounjaro is approved, telehealth visit at 1 and 2 months from now to up titrate if indicated   In-person visit 3 months with A1c    Orders:  -     POCT HEMOGLOBIN A1C  -     tirzepatide (MOUNJARO) 2.5 mg/0.5 mL PnIj; Inject 2.5 mg into the skin every 7 days.  Dispense: 4 Pen; Refill: 2  -     Hemoglobin A1C; Future; Expected date: 01/14/2025         No follow-ups on file. In addition to their scheduled follow up, the patient has also been instructed to follow up on as needed basis.     No " future appointments.     Mitesh Pierre MD

## 2024-10-14 NOTE — ASSESSMENT & PLAN NOTE
Expected worsening of A1c today given discontinuance of glimepiride  We will resend Mounjaro 2.5  He will discontinue Rybelsus 7 mg daily  He will continue both Farxiga and metformin at the above doses  Restart glimepiride at above dose only if Mounjaro not approved  If Mounjaro is approved, telehealth visit at 1 and 2 months from now to up titrate if indicated   In-person visit 3 months with A1c

## 2024-10-17 ENCOUNTER — DOCUMENTATION ONLY (OUTPATIENT)
Dept: FAMILY MEDICINE | Facility: CLINIC | Age: 59
End: 2024-10-17
Payer: COMMERCIAL

## 2024-11-13 ENCOUNTER — CLINICAL SUPPORT (OUTPATIENT)
Dept: FAMILY MEDICINE | Facility: CLINIC | Age: 59
End: 2024-11-13
Payer: COMMERCIAL

## 2024-11-13 DIAGNOSIS — E11.9 TYPE 2 DIABETES MELLITUS WITHOUT COMPLICATION, WITHOUT LONG-TERM CURRENT USE OF INSULIN: Primary | ICD-10-CM

## 2024-11-13 RX ORDER — TIRZEPATIDE 5 MG/.5ML
5 INJECTION, SOLUTION SUBCUTANEOUS
Qty: 4 PEN | Refills: 2 | Status: SHIPPED | OUTPATIENT
Start: 2024-11-13

## 2024-11-13 NOTE — ASSESSMENT & PLAN NOTE
Has been on Mounjaro 2.5 for one-month   No side-effects, no decrease in appetite  Increase to 5 mg and will stay there for 2 months   Follow up with A1c and visit in 2 months

## 2024-11-13 NOTE — PROGRESS NOTES
Family Medicine      Patient ID: [id number]     Chief Complaint: No chief complaint on file.      HPI:     This is a telemedicine note. Patient was treated using telemedicine, real time audio and video, according to [facility] protocols. IMitesh MD, conducted the visit from the [facility]. The patient participated in the visit at a non-[facility] location selected by the patient, identified below. I am licensed in the state where the patient stated they are located. The patient stated that they understood and accepted the privacy and security risks to their information at their location. This visit is not recorded.    Patient was located at the patient's home.          Herbert Ramirez is a 59 y.o. male here today for a telemedicine visit.  We discussed his Mounjaro.    Past Medical History:   Diagnosis Date    Albuminuria     Elevated LFTs     Fatty liver     History of cataract     Hyperlipidemia     Hypertension     Obesity, unspecified     RAYA (obstructive sleep apnea)     Seasonal allergies     Type 2 diabetes mellitus without complications     Umbilical hernia     Venous stasis         Past Surgical History:   Procedure Laterality Date    CATARACT EXTRACTION Right     COLONOSCOPY  08/24/2014    Dr. Isbell - f/u 10 years    NOSE SURGERY      UMBILICAL HERNIA REPAIR          Social History     Tobacco Use    Smoking status: Never    Smokeless tobacco: Never   Substance and Sexual Activity    Alcohol use: Not Currently    Drug use: Never    Sexual activity: Yes        Current Outpatient Medications   Medication Instructions    ascorbic acid (vitamin C) (VITAMIN C) 1,000 mg, Daily    ascorbic acid-multivit-min (EMERGEN-C) 1,000 mg PwEP Take by mouth.    BERBERINE CHLORIDE ORAL Take by mouth.    bergamot extract (CITRUS BERGAMOT ORAL) Take by mouth.    calcium carbonate (OS-NELIA) 600 mg calcium (1,500 mg) Tab Daily    calcium carbonate/vitamin D3 (VITAMIN D-3 ORAL) 1,000 mg    cholecalciferol, vitD3,/vit K2  (VITAMIN D3-VITAMIN K2 ORAL) Daily    cyanocobalamin (VITAMIN B-12) 100 MCG tablet Daily    dapagliflozin propanediol (FARXIGA) 10 mg, Oral, Daily    ezetimibe (ZETIA) 10 mg, Oral, Daily    FLAXSEED ORAL Take by mouth.    glimepiride (AMARYL) 1 mg, Oral, 2 times daily after meals    irbesartan-hydrochlorothiazide (AVALIDE) 150-12.5 mg per tablet 0.5 tablets, Oral, Every morning    Lactobac no.41/Bifidobact no.7 (PROBIOTIC-10 ORAL) 10 mg, Daily    metFORMIN (GLUCOPHAGE-XR) 1,000 mg, Oral, 2 times daily with meals    MOUNJARO 5 mg, Subcutaneous, Every 7 days    omega 3-dha-epa-fish oil (FISH OIL) 1,000 mg (120 mg-180 mg) Cap 1 capsule, Daily    pitavastatin calcium (LIVALO) 2 mg, Oral, 2 times daily    psyllium husk (METAMUCIL) 3.4 gram/5.4 gram Powd Daily    pyridoxine (vitamin B6) (VITAMIN B-6) 250 mg, Daily    RESVERATROL ORAL Take by mouth.    selenium 200 mcg, Daily    turmeric root extract 500 mg Cap Take by mouth.    vitamin E 400 Units, Daily    ZINC ACETATE ORAL Take by mouth.       Review of patient's allergies indicates:   Allergen Reactions    Statins-hmg-coa reductase inhibitors     Trulicity [dulaglutide]         Patient Care Team:  Isaac Hernandez Sr., MD as PCP - General (Family Medicine)  Jae Isbell MD as Consulting Physician (Gastroenterology)  Warner Lopez MD (Ophthalmology)  Aixa Manjarrez DPM as Consulting Physician (Podiatry)     Subjective:     Review of Systems   Constitutional:  Negative for activity change, appetite change, fatigue, fever and unexpected weight change.   HENT:  Negative for congestion, dental problem, rhinorrhea and trouble swallowing.    Eyes:  Negative for visual disturbance.   Respiratory:  Negative for chest tightness and shortness of breath.    Cardiovascular:  Negative for chest pain, palpitations and leg swelling.   Gastrointestinal:  Negative for abdominal pain, blood in stool, constipation, diarrhea, nausea and vomiting.   Endocrine: Negative for  polydipsia, polyphagia and polyuria.   Genitourinary:  Negative for difficulty urinating.   Musculoskeletal:  Negative for gait problem.   Skin:  Negative for pallor, rash and wound.   Neurological:  Negative for dizziness, tremors, seizures, syncope, speech difficulty, weakness, light-headedness and headaches.   Psychiatric/Behavioral:  Negative for confusion and suicidal ideas. The patient is not nervous/anxious.        12 point review of systems conducted, negative except as stated in the history of present illness. See HPI for details.    Objective:     There were no vitals taken for this visit.    Physical Exam    Physical Exam: LIMITED DUE TO TELEMEDICINE RESTRICTIONS.  General: Alert and oriented, No acute distress.  Head: Normocephalic  Eye: Sclera non-icteric.  Respiratory: Non-labored respirations, Symmetrical chest wall expansion.  Musculoskeletal: Normal range of motion.  Integumentary:  No visible suspicious lesions or rashes. No diaphoresis.   Neurologic: No focal deficits  Psychiatric: Normal interaction, Coherent speech, Euthymic mood, Appropriate affect     Assessment:       ICD-10-CM ICD-9-CM   1. Type 2 diabetes mellitus without complication, without long-term current use of insulin  E11.9 250.00        Plan:     1. Type 2 diabetes mellitus without complication, without long-term current use of insulin  Overview:  Mounjaro 2.5 mg (started October 2024)  Metformin 1000 mg b.i.d. (500 x2 b.i.d.)  Farxiga 10 mg daily    Rybelsus 7 mg daily stopped when Mounjaro started  Glimepiride 1 mg b.i.d. stopped in anticipation of starting Mounjaro  Insurance would not pay for Mounjaro because patient refilled Rybelsus        Assessment & Plan:  Has been on Mounjaro 2.5 for one-month   No side-effects, no decrease in appetite  Increase to 5 mg and will stay there for 2 months   Follow up with A1c and visit in 2 months    Orders:  -     tirzepatide (MOUNJARO) 5 mg/0.5 mL PnIj; Inject 5 mg into the skin every 7  days.  Dispense: 4 Pen; Refill: 2  -     Apolipoprotein B; Future; Expected date: 01/14/2025  -     Comprehensive Metabolic Panel; Future; Expected date: 01/14/2025  -     CBC Auto Differential; Future; Expected date: 01/14/2025  -     Lipid Panel; Future; Expected date: 01/14/2025  -     Vitamin D; Future; Expected date: 01/14/2025  -     PSA, Screening; Future; Expected date: 01/14/2025  -     Urinalysis; Future; Expected date: 01/14/2025       He will follow up in January for an A1c and the remainder of his routine labs    No follow-ups on file. In addition to their scheduled follow up, the patient has also been instructed to follow up on as needed basis.     Future Appointments   Date Time Provider Department Center   12/11/2024 11:20 AM PROVIDER, [facility] AL[facility]AH [facility]   1/8/2025  9:20 AM LAB, [facility] FAMILY MED [facility]   1/15/2025  8:00 AM Isaac Hernandez Sr., MD [facility]        Video Time Documentation:  Spent 10 minutes with patient face to face discussed health concerns. More than 50% of this time was spent in counseling and coordination of care.    Mitesh Pierre MD     Answers submitted by the patient for this visit:  Diabetes Questionnaire (Submitted on 11/6/2024)  Chief Complaint: Diabetes problem  Diabetes type: type 2  MedicAlert ID: No  Disease duration: 2 Months  foot paresthesias: No  foot ulcerations: No  visual change: Yes  weight loss: No  Symptom course: stable  hunger: No  mood changes: No  sleepiness: No  sweats: No  blackouts: No  hospitalization: No  nocturnal hypoglycemia: No  required assistance: No  required glucagon: No  CVA: No  heart disease: No  impotence: No  nephropathy: No  peripheral neuropathy: No  PVD: No  retinopathy: No  autonomic neuropathy: No  CAD risks: dyslipidemia, family history, hypertension, obesity, sedentary lifestyle, stress, diabetes mellitus, male sex  Current treatments: oral agent (triple therapy)  Treatment  compliance: all of the time  Given by: patient  Injection sites: abdominal wall  Home blood tests: 1-2 x per week  Monitoring compliance: adequate  Blood glucose trend: no change  Weight trend: stable  Current diet: generally unhealthy, high fat/cholesterol  Meal planning: none  Exercise: never  Dietitian visit: No  Eye exam current: Yes  Sees podiatrist: No

## 2025-01-10 ENCOUNTER — TELEPHONE (OUTPATIENT)
Dept: FAMILY MEDICINE | Facility: CLINIC | Age: 60
End: 2025-01-10
Payer: COMMERCIAL

## 2025-01-10 NOTE — TELEPHONE ENCOUNTER
----- Message from Mitesh Pierre MD sent at 1/9/2025 11:36 AM CST -----  No acute lab concerns.  We will discuss all results at the upcoming appointment.

## 2025-01-11 LAB — APO B SERPL-MCNC: 71 MG/DL

## 2025-01-12 NOTE — ASSESSMENT & PLAN NOTE
Lab Results   Component Value Date    TRIG 87 01/08/2025    TRIG 227 (A) 01/30/2023    HDL 53 01/08/2025    HDL 49 01/30/2023    TOTALCHOLEST 4.7 01/30/2023    LDLCALC 70 01/08/2025     LDL 70; near diabetic goal < 70.  ApoB at goal today, 71.  Continue Pitavastatin (Livalo) 4 mg daily, and Zetia 10 mg once daily.  Repeat Lipids 3 mos.      The combination of the above medication changes should get his LDL to about 80, close to goal  If worse sufficiently far away from goal in 3 months we will try Crestor instead of Livalo because he does not recall if he has tried that yet

## 2025-01-12 NOTE — ASSESSMENT & PLAN NOTE
A1c 7.0% today.  Goal of A1c at or below 7.0% today.  Reasonable to lower A1c to 6.5%.  Currently taking Metformin 1000 mg BID (two 500 mg BID), and Farxiga 10 mg daily.  Discussed increasing Mounjaro to 7.5 mg weekly for additional lowering of A1c and wt loss.  No side-effects.  Does not want to increase Mounjaro from 5 mg to 7.5 mg today; prefers to work on diet and exercise.  Repeat A1c and CMP 3 mos.

## 2025-01-15 ENCOUNTER — OFFICE VISIT (OUTPATIENT)
Dept: FAMILY MEDICINE | Facility: CLINIC | Age: 60
End: 2025-01-15
Payer: COMMERCIAL

## 2025-01-15 VITALS
WEIGHT: 291.81 LBS | DIASTOLIC BLOOD PRESSURE: 69 MMHG | HEART RATE: 71 BPM | SYSTOLIC BLOOD PRESSURE: 118 MMHG | BODY MASS INDEX: 41.78 KG/M2 | RESPIRATION RATE: 20 BRPM | TEMPERATURE: 99 F | HEIGHT: 70 IN | OXYGEN SATURATION: 95 %

## 2025-01-15 DIAGNOSIS — E11.9 TYPE 2 DIABETES MELLITUS WITHOUT COMPLICATION, WITHOUT LONG-TERM CURRENT USE OF INSULIN: Primary | ICD-10-CM

## 2025-01-15 DIAGNOSIS — Z23 NEED FOR INFLUENZA VACCINATION: ICD-10-CM

## 2025-01-15 DIAGNOSIS — E78.2 MIXED HYPERLIPIDEMIA: ICD-10-CM

## 2025-01-15 PROCEDURE — 1159F MED LIST DOCD IN RCRD: CPT | Mod: CPTII,,,

## 2025-01-15 PROCEDURE — 3078F DIAST BP <80 MM HG: CPT | Mod: CPTII,,,

## 2025-01-15 PROCEDURE — 90656 IIV3 VACC NO PRSV 0.5 ML IM: CPT | Mod: ,,,

## 2025-01-15 PROCEDURE — 3051F HG A1C>EQUAL 7.0%<8.0%: CPT | Mod: CPTII,,,

## 2025-01-15 PROCEDURE — 3074F SYST BP LT 130 MM HG: CPT | Mod: CPTII,,,

## 2025-01-15 PROCEDURE — 99214 OFFICE O/P EST MOD 30 MIN: CPT | Mod: 25,,,

## 2025-01-15 PROCEDURE — 1160F RVW MEDS BY RX/DR IN RCRD: CPT | Mod: CPTII,,,

## 2025-01-15 PROCEDURE — 3008F BODY MASS INDEX DOCD: CPT | Mod: CPTII,,,

## 2025-01-15 PROCEDURE — 90471 IMMUNIZATION ADMIN: CPT | Mod: ,,,

## 2025-01-15 NOTE — PROGRESS NOTES
Family Medicine [facility]  Kishan ValeraLADY-C    Patient ID: [id number]     Chief Complaint: Follow-up (Lab results)      HPI:     Herbert Ramirez is a 59 y.o. male here today for lab results today. Pt reports doing well overall.     Past Medical History:   Diagnosis Date    Albuminuria     Elevated LFTs     Fatty liver     History of cataract     Hyperlipidemia     Hypertension     Obesity, unspecified     RAYA (obstructive sleep apnea)     Seasonal allergies     Type 2 diabetes mellitus without complications     Umbilical hernia     Venous stasis         Past Surgical History:   Procedure Laterality Date    CATARACT EXTRACTION Right     COLONOSCOPY  08/24/2014    Dr. Isbell - f/u 10 years    NOSE SURGERY      UMBILICAL HERNIA REPAIR          Social History     Tobacco Use    Smoking status: Never    Smokeless tobacco: Never   Substance and Sexual Activity    Alcohol use: Not Currently    Drug use: Never    Sexual activity: Yes        Current Outpatient Medications   Medication Instructions    ascorbic acid (vitamin C) (VITAMIN C) 1,000 mg, Daily    ascorbic acid-multivit-min (EMERGEN-C) 1,000 mg PwEP Take by mouth.    BERBERINE CHLORIDE ORAL Take by mouth.    bergamot extract (CITRUS BERGAMOT ORAL) Take by mouth.    calcium carbonate (OS-NELIA) 600 mg calcium (1,500 mg) Tab Daily    calcium carbonate/vitamin D3 (VITAMIN D-3 ORAL) 1,000 mg    cholecalciferol, vitD3,/vit K2 (VITAMIN D3-VITAMIN K2 ORAL) Daily    cyanocobalamin (VITAMIN B-12) 100 MCG tablet Daily    dapagliflozin propanediol (FARXIGA) 10 mg, Oral, Daily    ezetimibe (ZETIA) 10 mg, Oral, Daily    FLAXSEED ORAL Take by mouth.    glimepiride (AMARYL) 1 mg, Oral, 2 times daily after meals    irbesartan-hydrochlorothiazide (AVALIDE) 150-12.5 mg per tablet 0.5 tablets, Oral, Every morning    Lactobac no.41/Bifidobact no.7 (PROBIOTIC-10 ORAL) 10 mg, Daily    metFORMIN (GLUCOPHAGE-XR) 1,000 mg, Oral, 2 times daily with meals    MOUNJARO 5 mg, Subcutaneous, Every 7  "days    omega 3-dha-epa-fish oil (FISH OIL) 1,000 mg (120 mg-180 mg) Cap 1 capsule, Daily    pitavastatin calcium (LIVALO) 2 mg, Oral, 2 times daily    psyllium husk (METAMUCIL) 3.4 gram/5.4 gram Powd Daily    pyridoxine (vitamin B6) (VITAMIN B-6) 250 mg, Daily    RESVERATROL ORAL Take by mouth.    selenium 200 mcg, Daily    turmeric root extract 500 mg Cap Take by mouth.    vitamin E 400 Units, Daily    ZINC ACETATE ORAL Take by mouth.       Review of patient's allergies indicates:   Allergen Reactions    Statins-hmg-coa reductase inhibitors     Trulicity [dulaglutide]         Patient Care Team:  Isaac Hernandez Sr., MD as PCP - General (Family Medicine)  Jae Isbell MD as Consulting Physician (Gastroenterology)  Warner Lopez MD (Ophthalmology)  Aixa Manjarrez DPM as Consulting Physician (Podiatry)     Subjective:     Review of Systems    12 point review of systems conducted, negative except as stated in the history of present illness. See HPI for details.    Objective:     Visit Vitals  /69 (BP Location: Right arm, Patient Position: Sitting)   Pulse 71   Temp 98.5 °F (36.9 °C)   Resp 20   Ht 5' 10" (1.778 m)   Wt 132.4 kg (291 lb 12.8 oz)   SpO2 95%   BMI 41.87 kg/m²       Physical Exam  Vitals and nursing note reviewed.   Constitutional:       General: He is not in acute distress.     Appearance: He is not ill-appearing.   HENT:      Head: Normocephalic and atraumatic.      Mouth/Throat:      Mouth: Mucous membranes are moist.      Pharynx: Oropharynx is clear.   Eyes:      General: No scleral icterus.     Extraocular Movements: Extraocular movements intact.      Conjunctiva/sclera: Conjunctivae normal.      Pupils: Pupils are equal, round, and reactive to light.   Neck:      Vascular: No carotid bruit.   Cardiovascular:      Rate and Rhythm: Normal rate and regular rhythm.      Pulses:           Dorsalis pedis pulses are 2+ on the right side and 2+ on the left side.        Posterior " tibial pulses are 2+ on the right side and 2+ on the left side.      Heart sounds: No murmur heard.     No friction rub. No gallop.   Pulmonary:      Effort: Pulmonary effort is normal. No respiratory distress.      Breath sounds: Normal breath sounds. No wheezing, rhonchi or rales.   Abdominal:      General: Abdomen is flat. Bowel sounds are normal. There is no distension.      Palpations: Abdomen is soft. There is no mass.      Tenderness: There is no abdominal tenderness.   Musculoskeletal:         General: Normal range of motion.      Cervical back: Normal range of motion and neck supple.      Right foot: No deformity.      Left foot: No deformity.   Feet:      Right foot:      Protective Sensation: 5 sites tested.  5 sites sensed.      Skin integrity: Skin integrity normal. No ulcer, blister, skin breakdown, erythema, warmth, callus, dry skin or fissure.      Toenail Condition: Right toenails are normal.      Left foot:      Protective Sensation: 5 sites tested.  5 sites sensed.      Skin integrity: Skin integrity normal. No ulcer, blister, skin breakdown, erythema, warmth, callus, dry skin or fissure.      Toenail Condition: Left toenails are normal.   Skin:     General: Skin is warm and dry.   Neurological:      General: No focal deficit present.      Mental Status: He is alert.   Psychiatric:         Mood and Affect: Mood normal.         Labs Reviewed:     Chemistry:  Lab Results   Component Value Date     01/08/2025    K 4.7 01/08/2025    BUN 18 01/08/2025    CREATININE 0.73 (L) 01/08/2025    EGFRNORACEVR 104 01/08/2025    CALCIUM 9.5 01/08/2025    ALBUMIN 4.6 01/08/2025    BILIDIR <0.20 12/18/2023    AST 20 01/08/2025    ALT 17 01/08/2025    OCPLOHCB48KR 70.8 01/08/2025    TSH 1.870 07/01/2024        Lab Results   Component Value Date    HGBA1C 7.0 (H) 01/08/2025        Hematology:  Lab Results   Component Value Date    WBC 7.3 01/08/2025    HGB 16.1 01/08/2025    HCT 48.6 01/08/2025      "01/08/2025       Lipid Panel:  Lab Results   Component Value Date    CHOL 140 01/08/2025    HDL 53 01/08/2025    TRIG 87 01/08/2025    TOTALCHOLEST 4.7 01/30/2023        Urine:  No results found for: "COLORUA", "APPEARANCEUA", "SGUA", "PHUA", "PROTEINUA", "GLUCOSEUA", "KETONESUA", "BLOODUA", "NITRITESUA", "LEUKOCYTESUR", "RBCUA", "WBCUA", "BACTERIA", "SQEPUA", "HYALINECASTS", "CREATRANDUR", "PROTEINURINE", "UPROTCREA"     Assessment:       ICD-10-CM ICD-9-CM   1. Type 2 diabetes mellitus without complication, without long-term current use of insulin  E11.9 250.00   2. Mixed hyperlipidemia  E78.2 272.2   3. Need for influenza vaccination  Z23 V04.81        Plan:     1. Type 2 diabetes mellitus without complication, without long-term current use of insulin  Overview:  Mounjaro 2.5 mg (started October 2024)  Metformin 1000 mg b.i.d. (500 x2 b.i.d.)  Farxiga 10 mg daily    Rybelsus 7 mg daily stopped when Mounjaro started  Glimepiride 1 mg b.i.d. stopped in anticipation of starting Mounjaro  Insurance would not pay for Mounjaro because patient refilled Rybelsus        Assessment & Plan:  A1c 7.0% today.  Goal of A1c at or below 7.0% today.  Reasonable to lower A1c to 6.5%.  Currently taking Metformin 1000 mg BID (two 500 mg BID), and Farxiga 10 mg daily.  Discussed increasing Mounjaro to 7.5 mg weekly for additional lowering of A1c and wt loss.  No side-effects.  Does not want to increase Mounjaro from 5 mg to 7.5 mg today; prefers to work on diet and exercise.  Repeat A1c and CMP 3 mos.      Orders:  -     Hemoglobin A1C; Future; Expected date: 04/11/2025  -     Comprehensive Metabolic Panel; Future; Expected date: 04/11/2025  -     Microalbumin/Creatinine Ratio, Urine; Future; Expected date: 01/15/2025    2. Mixed hyperlipidemia  Overview:  Hyperlipidemia Medications                    omega 3-dha-epa-fish oil (FISH OIL) 1,000 mg (120 mg-180 mg) Cap Take 1 capsule by mouth once daily.    pitavastatin calcium (LIVALO) " 2 mg Tab tablet Take 1 tablet (1 mg total) by mouth every evening.     Lovastatin discontinued last visit  Patient is diabetic  Diabetic LDL-C goal: Below 70  7/2024 = LDL-C:  116  Not at goal       Assessment & Plan:  Lab Results   Component Value Date    TRIG 87 01/08/2025    TRIG 227 (A) 01/30/2023    HDL 53 01/08/2025    HDL 49 01/30/2023    TOTALCHOLEST 4.7 01/30/2023    LDLCALC 70 01/08/2025     LDL 70; near diabetic goal < 70.  ApoB at goal today, 71.  Continue Pitavastatin (Livalo) 4 mg daily, and Zetia 10 mg once daily.  Repeat Lipids 3 mos.      The combination of the above medication changes should get his LDL to about 80, close to goal  If worse sufficiently far away from goal in 3 months we will try Crestor instead of Livalo because he does not recall if he has tried that yet    Orders:  -     Lipid Panel; Future; Expected date: 04/11/2025    3. Need for influenza vaccination  -     influenza (Flulaval, Fluzone, Fluarix) 45 mcg/0.5 mL IM vaccine (> or = 6 mo) 0.5 mL       Declines all other vaccines today.  Follow up in about 3 months (around 4/15/2025) for routine follow up with labs.. In addition to their scheduled follow up, the patient has also been instructed to follow up on as needed basis.     Future Appointments   Date Time Provider Department Center   4/15/2025  7:20 AM LAB, [facility]   4/21/2025 10:40 AM PROVIDER, [facility] FAM[facility] MEDIC[facility] LADY [place]

## 2025-01-16 LAB
ALBUMIN/CREAT UR: 748 MG/G CREAT (ref 0–29)
CREAT UR-MCNC: 25.8 MG/DL
MICROALBUMIN UR-MCNC: 193.1 UG/ML

## 2025-01-16 NOTE — PROGRESS NOTES
Please inform patient of results.    1. Microalbumin/creatinine ratio severely elevated but kidney function within normal ranges.     Stress diet modifications and A1C control. Last A1C is 7.0- would like to get the patient's A1C lower for better glucose control.     Recheck A1C, CMP, Urinalysis + Microalbumin/creatinine ratio in 3 months.   If elevated again in three months, would recommend US of kidneys for further evaluation?     Please advise??     All other findings within acceptable ranges.    Thanks,    Kishan Valera, BEAUP

## 2025-01-30 ENCOUNTER — OFFICE VISIT (OUTPATIENT)
Dept: FAMILY MEDICINE | Facility: CLINIC | Age: 60
End: 2025-01-30
Payer: COMMERCIAL

## 2025-01-30 VITALS
RESPIRATION RATE: 20 BRPM | HEART RATE: 87 BPM | OXYGEN SATURATION: 96 % | WEIGHT: 295.81 LBS | DIASTOLIC BLOOD PRESSURE: 82 MMHG | HEIGHT: 70 IN | TEMPERATURE: 98 F | BODY MASS INDEX: 42.35 KG/M2 | SYSTOLIC BLOOD PRESSURE: 139 MMHG

## 2025-01-30 DIAGNOSIS — J32.9 RHINOSINUSITIS: ICD-10-CM

## 2025-01-30 DIAGNOSIS — J02.9 SORE THROAT: Primary | ICD-10-CM

## 2025-01-30 LAB
CTP QC/QA: YES
S PYO RRNA THROAT QL PROBE: NEGATIVE

## 2025-01-30 RX ORDER — AMOXICILLIN AND CLAVULANATE POTASSIUM 875; 125 MG/1; MG/1
1 TABLET, FILM COATED ORAL EVERY 12 HOURS
Qty: 14 TABLET | Refills: 0 | Status: SHIPPED | OUTPATIENT
Start: 2025-01-30 | End: 2025-02-06

## 2025-01-30 RX ORDER — FLUTICASONE PROPIONATE 50 MCG
1 SPRAY, SUSPENSION (ML) NASAL DAILY
Qty: 15.8 ML | Refills: 0 | Status: SHIPPED | OUTPATIENT
Start: 2025-01-30

## 2025-01-30 NOTE — PROGRESS NOTES
[facility]  Erika Fan MD    Patient ID: [id number]     Chief Complaint: Sore Throat (C/o sore throat, dry cough, and nasal congestion since Monday)      HPI:     Herbert Ramirez is a 59 y.o. male here today for sore throat.    Reports worsening nasal congestion, facial pain, sore throat, and productive cough with brown sputum for 6 days.  He denied any fevers.    Past Medical History:   Diagnosis Date    Albuminuria     Elevated LFTs     Fatty liver     History of cataract     Hyperlipidemia     Hypertension     Obesity, unspecified     RAYA (obstructive sleep apnea)     Seasonal allergies     Type 2 diabetes mellitus without complications     Umbilical hernia     Venous stasis         Past Surgical History:   Procedure Laterality Date    CATARACT EXTRACTION Right     COLONOSCOPY  08/24/2014    Dr. Isbell - f/u 10 years    NOSE SURGERY      UMBILICAL HERNIA REPAIR          Social History     Tobacco Use    Smoking status: Never    Smokeless tobacco: Never   Substance and Sexual Activity    Alcohol use: Not Currently    Drug use: Never    Sexual activity: Yes        Current Outpatient Medications   Medication Instructions    amoxicillin-clavulanate 875-125mg (AUGMENTIN) 875-125 mg per tablet 1 tablet, Oral, Every 12 hours    ascorbic acid (vitamin C) (VITAMIN C) 1,000 mg, Daily    ascorbic acid-multivit-min (EMERGEN-C) 1,000 mg PwEP Take by mouth.    BERBERINE CHLORIDE ORAL Take by mouth.    bergamot extract (CITRUS BERGAMOT ORAL) Take by mouth.    calcium carbonate (OS-NELIA) 600 mg calcium (1,500 mg) Tab Daily    calcium carbonate/vitamin D3 (VITAMIN D-3 ORAL) 1,000 mg    cholecalciferol, vitD3,/vit K2 (VITAMIN D3-VITAMIN K2 ORAL) Daily    cyanocobalamin (VITAMIN B-12) 100 MCG tablet Daily    dapagliflozin propanediol (FARXIGA) 10 mg, Oral, Daily    ezetimibe (ZETIA) 10 mg, Oral, Daily    FLAXSEED ORAL Take by mouth.    fluticasone propionate (FLONASE) 50 mcg, Each Nostril, Daily    glimepiride  "(AMARYL) 1 mg, Oral, 2 times daily after meals    irbesartan-hydrochlorothiazide (AVALIDE) 150-12.5 mg per tablet 0.5 tablets, Oral, Every morning    Lactobac no.41/Bifidobact no.7 (PROBIOTIC-10 ORAL) 10 mg, Daily    metFORMIN (GLUCOPHAGE-XR) 1,000 mg, Oral, 2 times daily with meals    MOUNJARO 5 mg, Subcutaneous, Every 7 days    omega 3-dha-epa-fish oil (FISH OIL) 1,000 mg (120 mg-180 mg) Cap 1 capsule, Daily    pitavastatin calcium (LIVALO) 2 mg, Oral, 2 times daily    psyllium husk (METAMUCIL) 3.4 gram/5.4 gram Powd Daily    pyridoxine (vitamin B6) (VITAMIN B-6) 250 mg, Daily    RESVERATROL ORAL Take by mouth.    selenium 200 mcg, Daily    turmeric root extract 500 mg Cap Take by mouth.    vitamin E 400 Units, Daily    ZINC ACETATE ORAL Take by mouth.       Review of patient's allergies indicates:   Allergen Reactions    Statins-hmg-coa reductase inhibitors     Trulicity [dulaglutide]         Patient Care Team:  Isaac Hernandez Sr., MD as PCP - General (Family Medicine)  Jae Isbell MD as Consulting Physician (Gastroenterology)  Warner Lopez MD (Ophthalmology)  Aixa Manjarrez DPM as Consulting Physician (Podiatry)     Subjective:     Review of Systems   Constitutional:  Negative for chills and fever.   HENT:  Negative for ear pain, postnasal drip, rhinorrhea and sore throat.    Respiratory:  Positive for cough. Negative for shortness of breath and wheezing.    Cardiovascular:  Negative for chest pain.   Musculoskeletal:  Negative for myalgias.   Skin:  Negative for rash.   Allergic/Immunologic: Negative for environmental allergies.   Neurological:  Negative for headaches.       12 point review of systems conducted, negative except as stated in the history of present illness. See HPI for details.    Objective:     Visit Vitals  /82 (BP Location: Right arm, Patient Position: Sitting)   Pulse 87   Temp 98 °F (36.7 °C)   Resp 20   Ht 5' 10" (1.778 m)   Wt 134.2 kg (295 lb 12.8 oz)   SpO2 " 96%   BMI 42.44 kg/m²       Physical Exam  Vitals and nursing note reviewed.   Constitutional:       General: He is not in acute distress.     Appearance: He is not ill-appearing.   HENT:      Head: Normocephalic and atraumatic.      Nose: Congestion present.      Mouth/Throat:      Mouth: Mucous membranes are moist.      Pharynx: Oropharynx is clear.   Eyes:      General: No scleral icterus.     Extraocular Movements: Extraocular movements intact.      Conjunctiva/sclera: Conjunctivae normal.      Pupils: Pupils are equal, round, and reactive to light.   Cardiovascular:      Rate and Rhythm: Normal rate and regular rhythm.      Heart sounds: No murmur heard.     No friction rub. No gallop.   Pulmonary:      Effort: Pulmonary effort is normal. No respiratory distress.      Breath sounds: Normal breath sounds. No wheezing, rhonchi or rales.   Abdominal:      General: Abdomen is flat. Bowel sounds are normal. There is no distension.      Palpations: Abdomen is soft. There is no mass.      Tenderness: There is no abdominal tenderness.   Musculoskeletal:         General: Normal range of motion.   Skin:     General: Skin is warm and dry.   Neurological:      General: No focal deficit present.      Mental Status: He is alert.   Psychiatric:         Mood and Affect: Mood normal.         Labs Reviewed:     Chemistry:  Lab Results   Component Value Date     01/08/2025    K 4.7 01/08/2025    BUN 18 01/08/2025    CREATININE 0.73 (L) 01/08/2025    EGFRNORACEVR 104 01/08/2025    CALCIUM 9.5 01/08/2025    ALBUMIN 4.6 01/08/2025    BILIDIR <0.20 12/18/2023    AST 20 01/08/2025    ALT 17 01/08/2025    [id number] 70.8 01/08/2025    TSH 1.870 07/01/2024        Lab Results   Component Value Date    HGBA1C 7.0 (H) 01/08/2025        Hematology:  Lab Results   Component Value Date    WBC 7.3 01/08/2025    HGB 16.1 01/08/2025    HCT 48.6 01/08/2025     01/08/2025       Lipid Panel:  Lab Results   Component Value Date     CHOL 140 01/08/2025    HDL 53 01/08/2025    TRIG 87 01/08/2025    TOTALCHOLEST 4.7 01/30/2023        Urine:  Lab Results   Component Value Date    ONUR 25.8 01/15/2025        Assessment:       ICD-10-CM ICD-9-CM   1. Sore throat  J02.9 462   2. Rhinosinusitis  J32.9 473.9        Plan:     1. Sore throat  -     POCT Rapid Strep A    2. Rhinosinusitis  -     POCT Rapid Strep A  -     fluticasone propionate (FLONASE) 50 mcg/actuation nasal spray; 1 spray (50 mcg total) by Each Nostril route once daily.  Dispense: 15.8 mL; Refill: 0  -     amoxicillin-clavulanate 875-125mg (AUGMENTIN) 875-125 mg per tablet; Take 1 tablet by mouth every 12 (twelve) hours. for 7 days  Dispense: 14 tablet; Refill: 0         Concern for bacterial rhinosinusitis due to worsening of symptoms over the past 6 days without fever.  We will prescribe Flonase and Augmentin to be started if his symptoms do not improve in the next 2-3 days.    In addition to their scheduled follow up, the patient has also been instructed to follow up on as needed basis.     Future Appointments   Date Time Provider Department Center   4/15/2025  7:20 AM LAB, [facility]   4/21/2025 10:40 AM PROVIDER, [facility] Fan MD

## 2025-04-16 DIAGNOSIS — E11.9 TYPE 2 DIABETES MELLITUS WITHOUT COMPLICATION, WITHOUT LONG-TERM CURRENT USE OF INSULIN: ICD-10-CM

## 2025-04-16 DIAGNOSIS — E78.2 MIXED HYPERLIPIDEMIA: Primary | ICD-10-CM

## 2025-04-16 RX ORDER — TIRZEPATIDE 5 MG/.5ML
5 INJECTION, SOLUTION SUBCUTANEOUS
Qty: 4 PEN | Refills: 2 | Status: SHIPPED | OUTPATIENT
Start: 2025-04-16

## 2025-04-16 RX ORDER — EZETIMIBE 10 MG/1
10 TABLET ORAL
Qty: 90 TABLET | Refills: 3 | OUTPATIENT
Start: 2025-04-16

## 2025-04-16 RX ORDER — EZETIMIBE 10 MG/1
10 TABLET ORAL DAILY
Qty: 90 TABLET | Refills: 3 | Status: SHIPPED | OUTPATIENT
Start: 2025-04-16 | End: 2026-04-16

## 2025-05-22 ENCOUNTER — RESULTS FOLLOW-UP (OUTPATIENT)
Dept: FAMILY MEDICINE | Facility: CLINIC | Age: 60
End: 2025-05-22

## 2025-05-29 ENCOUNTER — OFFICE VISIT (OUTPATIENT)
Dept: FAMILY MEDICINE | Facility: CLINIC | Age: 60
End: 2025-05-29
Payer: COMMERCIAL

## 2025-05-29 VITALS
OXYGEN SATURATION: 98 % | WEIGHT: 292 LBS | TEMPERATURE: 98 F | DIASTOLIC BLOOD PRESSURE: 74 MMHG | HEART RATE: 77 BPM | HEIGHT: 70 IN | SYSTOLIC BLOOD PRESSURE: 118 MMHG | RESPIRATION RATE: 18 BRPM | BODY MASS INDEX: 41.8 KG/M2

## 2025-05-29 DIAGNOSIS — R09.89 CAROTID BRUIT, UNSPECIFIED LATERALITY: ICD-10-CM

## 2025-05-29 DIAGNOSIS — E66.01 CLASS 3 SEVERE OBESITY DUE TO EXCESS CALORIES WITH SERIOUS COMORBIDITY AND BODY MASS INDEX (BMI) OF 40.0 TO 44.9 IN ADULT: ICD-10-CM

## 2025-05-29 DIAGNOSIS — E11.42 TYPE 2 DIABETES MELLITUS WITH DIABETIC POLYNEUROPATHY, WITHOUT LONG-TERM CURRENT USE OF INSULIN: Primary | ICD-10-CM

## 2025-05-29 DIAGNOSIS — E66.813 CLASS 3 SEVERE OBESITY DUE TO EXCESS CALORIES WITH SERIOUS COMORBIDITY AND BODY MASS INDEX (BMI) OF 40.0 TO 44.9 IN ADULT: ICD-10-CM

## 2025-05-29 DIAGNOSIS — E78.2 MIXED HYPERLIPIDEMIA: ICD-10-CM

## 2025-05-29 DIAGNOSIS — I10 PRIMARY HYPERTENSION: ICD-10-CM

## 2025-05-29 DIAGNOSIS — E11.9 TYPE 2 DIABETES MELLITUS WITHOUT COMPLICATION, WITHOUT LONG-TERM CURRENT USE OF INSULIN: ICD-10-CM

## 2025-05-29 PROCEDURE — 3074F SYST BP LT 130 MM HG: CPT | Mod: CPTII,,,

## 2025-05-29 PROCEDURE — 3044F HG A1C LEVEL LT 7.0%: CPT | Mod: CPTII,,,

## 2025-05-29 PROCEDURE — 3008F BODY MASS INDEX DOCD: CPT | Mod: CPTII,,,

## 2025-05-29 PROCEDURE — 3062F POS MACROALBUMINURIA REV: CPT | Mod: CPTII,,,

## 2025-05-29 PROCEDURE — 1160F RVW MEDS BY RX/DR IN RCRD: CPT | Mod: CPTII,,,

## 2025-05-29 PROCEDURE — 1159F MED LIST DOCD IN RCRD: CPT | Mod: CPTII,,,

## 2025-05-29 PROCEDURE — 3066F NEPHROPATHY DOC TX: CPT | Mod: CPTII,,,

## 2025-05-29 PROCEDURE — 99214 OFFICE O/P EST MOD 30 MIN: CPT | Mod: ,,,

## 2025-05-29 PROCEDURE — 3078F DIAST BP <80 MM HG: CPT | Mod: CPTII,,,

## 2025-05-29 RX ORDER — METFORMIN HYDROCHLORIDE 500 MG/1
1000 TABLET, EXTENDED RELEASE ORAL 2 TIMES DAILY WITH MEALS
Qty: 360 TABLET | Refills: 3 | Status: SHIPPED | OUTPATIENT
Start: 2025-05-29 | End: 2026-05-29

## 2025-05-29 RX ORDER — DAPAGLIFLOZIN 10 MG/1
10 TABLET, FILM COATED ORAL DAILY
Qty: 90 TABLET | Refills: 2 | Status: SHIPPED | OUTPATIENT
Start: 2025-05-29

## 2025-05-29 RX ORDER — PITAVASTATIN CALCIUM 2.09 MG/1
2 TABLET, FILM COATED ORAL 2 TIMES DAILY
Qty: 180 TABLET | Refills: 3 | Status: SHIPPED | OUTPATIENT
Start: 2025-05-29 | End: 2026-05-29

## 2025-05-29 RX ORDER — TIRZEPATIDE 5 MG/.5ML
5 INJECTION, SOLUTION SUBCUTANEOUS
Qty: 4 PEN | Refills: 2 | Status: SHIPPED | OUTPATIENT
Start: 2025-05-29

## 2025-05-29 NOTE — ASSESSMENT & PLAN NOTE
Patient has been successful with weight loss with Mounjaro.  He is not interested in increasing his dose at this time.

## 2025-05-29 NOTE — ASSESSMENT & PLAN NOTE
Lab Results   Component Value Date    TRIG 131 05/21/2025    TRIG 227 (A) 01/30/2023    HDL 55 05/21/2025    HDL 49 01/30/2023    TOTALCHOLEST 4.7 01/30/2023    LDLCALC 86 05/21/2025     LDL above goal.  Patient has not been completely compliant with his pitavastatin.  He is trying to wean himself off.  He has not been following his diet.  We will repeat lipid panel in 3 months

## 2025-05-29 NOTE — PROGRESS NOTES
[facility]  Erika Fan MD    Patient ID: [id number]     Chief Complaint: Results      HPI:     Herbert Ramirez is a 59 y.o. male with hypertension, hyperlipidemia, type 2 diabetes, obesity, venous stasis, fatty liver, RAYA, and seasonal allergies here today for follow up of chronic conditions.    LDL 86, increased from 70. He is taking the pitavastatin about 3 times per week  A1c 6.8    Patient has no concerns today and feels well.    Past Medical History:   Diagnosis Date    Albuminuria     Elevated LFTs     Fatty liver     History of cataract     Hyperlipidemia     Hypertension     Obesity, unspecified     RAYA (obstructive sleep apnea)     Seasonal allergies     Type 2 diabetes mellitus without complications     Umbilical hernia     Venous stasis         Past Surgical History:   Procedure Laterality Date    CATARACT EXTRACTION Right     COLONOSCOPY  08/24/2014    Dr. Isbell - f/u 10 years    NOSE SURGERY      UMBILICAL HERNIA REPAIR          Social History     Tobacco Use    Smoking status: Never    Smokeless tobacco: Never   Substance and Sexual Activity    Alcohol use: Not Currently    Drug use: Never    Sexual activity: Yes        Current Outpatient Medications   Medication Instructions    ascorbic acid (vitamin C) (VITAMIN C) 1,000 mg, Daily    ascorbic acid-multivit-min (EMERGEN-C) 1,000 mg PwEP Take by mouth.    BERBERINE CHLORIDE ORAL Take by mouth.    bergamot extract (CITRUS BERGAMOT ORAL) Take by mouth.    calcium carbonate (OS-NELIA) 600 mg calcium (1,500 mg) Tab Daily    calcium carbonate/vitamin D3 (VITAMIN D-3 ORAL) 1,000 mg    cholecalciferol, vitD3,/vit K2 (VITAMIN D3-VITAMIN K2 ORAL) Daily    cyanocobalamin (VITAMIN B-12) 100 MCG tablet Daily    dapagliflozin propanediol (FARXIGA) 10 mg, Oral, Daily    ezetimibe (ZETIA) 10 mg, Oral, Daily    FLAXSEED ORAL Take by mouth.    fluticasone propionate (FLONASE) 50 mcg, Each Nostril, Daily    glimepiride (AMARYL) 1 mg, Oral, 2 times daily  after meals    irbesartan-hydrochlorothiazide (AVALIDE) 150-12.5 mg per tablet 0.5 tablets, Oral, Every morning    Lactobac no.41/Bifidobact no.7 (PROBIOTIC-10 ORAL) 10 mg, Daily    metFORMIN (GLUCOPHAGE-XR) 1,000 mg, Oral, 2 times daily with meals    MOUNJARO 5 mg, Subcutaneous, Every 7 days    omega 3-dha-epa-fish oil (FISH OIL) 1,000 mg (120 mg-180 mg) Cap 1 capsule, Daily    pitavastatin calcium (LIVALO) 2 mg, Oral, 2 times daily    psyllium husk (METAMUCIL) 3.4 gram/5.4 gram Powd Daily    pyridoxine (vitamin B6) (VITAMIN B-6) 250 mg, Daily    RESVERATROL ORAL Take by mouth.    selenium 200 mcg, Daily    turmeric root extract 500 mg Cap Take by mouth.    vitamin E 400 Units, Daily    ZINC ACETATE ORAL Take by mouth.       Review of patient's allergies indicates:   Allergen Reactions    Statins-hmg-coa reductase inhibitors     Trulicity [dulaglutide]         Patient Care Team:  Isaac Hernandez Sr., MD (Inactive) as PCP - General (Family Medicine)  Jae Isbell MD as Consulting Physician (Gastroenterology)  Warner Lopez MD (Ophthalmology)  Aixa Manjarrez DPM as Consulting Physician (Podiatry)     Subjective:     Review of Systems   Constitutional:  Negative for activity change and unexpected weight change.   HENT:  Negative for hearing loss, rhinorrhea and trouble swallowing.    Eyes:  Negative for discharge and visual disturbance.   Respiratory:  Negative for chest tightness and wheezing.    Cardiovascular:  Negative for chest pain and palpitations.   Gastrointestinal:  Negative for blood in stool, constipation, diarrhea and vomiting.   Endocrine: Negative for polydipsia and polyuria.   Genitourinary:  Negative for difficulty urinating, hematuria and urgency.   Musculoskeletal:  Negative for arthralgias, joint swelling and neck pain.   Neurological:  Negative for weakness and headaches.   Psychiatric/Behavioral:  Negative for confusion and dysphoric mood.        12 point review of systems  "conducted, negative except as stated in the history of present illness. See HPI for details.    Objective:     Visit Vitals  /74   Pulse 77   Temp 97.9 °F (36.6 °C) (Skin)   Resp 18   Ht 5' 10" (1.778 m)   Wt 132.5 kg (292 lb)   SpO2 98%   BMI 41.90 kg/m²       Physical Exam  Vitals and nursing note reviewed.   Constitutional:       General: He is not in acute distress.     Appearance: Normal appearance. He is not ill-appearing or diaphoretic.   HENT:      Head: Normocephalic and atraumatic.      Mouth/Throat:      Mouth: Mucous membranes are moist.      Pharynx: Oropharynx is clear.   Eyes:      Extraocular Movements: Extraocular movements intact.      Conjunctiva/sclera: Conjunctivae normal.   Neck:      Vascular: Carotid bruit (bilateral) present.   Cardiovascular:      Rate and Rhythm: Normal rate and regular rhythm.      Pulses: Normal pulses.      Heart sounds: No murmur heard.  Pulmonary:      Effort: Pulmonary effort is normal. No respiratory distress.      Breath sounds: Normal breath sounds. No wheezing, rhonchi or rales.   Musculoskeletal:      Right lower leg: No edema.      Left lower leg: No edema.   Skin:     General: Skin is warm and dry.   Neurological:      General: No focal deficit present.      Mental Status: He is alert and oriented to person, place, and time. Mental status is at baseline.   Psychiatric:         Mood and Affect: Mood normal.         Labs Reviewed:     Chemistry:  Lab Results   Component Value Date     05/21/2025    K 5.0 05/21/2025    BUN 13 05/21/2025    CREATININE 0.75 (L) 05/21/2025    EGFRNORACEVR 103 05/21/2025    CALCIUM 9.7 05/21/2025    ALBUMIN 4.7 05/21/2025    BILIDIR <0.20 12/18/2023    AST 30 05/21/2025    ALT 26 05/21/2025    HJIRDPSZ84OT 70.8 01/08/2025    TSH 1.870 07/01/2024        Lab Results   Component Value Date    HGBA1C 6.8 (H) 05/21/2025        Hematology:  Lab Results   Component Value Date    WBC 7.3 01/08/2025    HGB 16.1 01/08/2025    HCT " 48.6 01/08/2025     01/08/2025       Lipid Panel:  Lab Results   Component Value Date    CHOL 164 05/21/2025    HDL 55 05/21/2025    TRIG 131 05/21/2025    TOTALCHOLEST 4.7 01/30/2023        Urine:  Lab Results   Component Value Date    PROTEINUA 2+ (A) 05/21/2025    LEUKOCYTESUR Negative 05/21/2025    RBCUA 0-2 05/21/2025    WBCUA None seen 05/21/2025    BACTERIA None seen 05/21/2025    CREATRANDUR 69.2 05/21/2025        Assessment:       ICD-10-CM ICD-9-CM   1. Type 2 diabetes mellitus with diabetic polyneuropathy, without long-term current use of insulin  E11.42 250.60     357.2   2. Class 3 severe obesity due to excess calories with serious comorbidity and body mass index (BMI) of 40.0 to 44.9 in adult  E66.813 278.01    E66.01 V85.41    Z68.41    3. Primary hypertension  I10 401.9   4. Mixed hyperlipidemia  E78.2 272.2   5. Carotid bruit, unspecified laterality  R09.89 785.9        Plan:     1. Type 2 diabetes mellitus with diabetic polyneuropathy, without long-term current use of insulin  Overview:  Mounjaro 5 mg (started October 2024)  Metformin 1000 mg b.i.d. (500 x2 b.i.d.)  Farxiga 10 mg daily     Rybelsus 7 mg daily stopped when Mounjaro started  Glimepiride 1 mg b.i.d. stopped in anticipation of starting Mounjaro               Assessment & Plan:  At goal.  Continue above regimen.  Lab Results   Component Value Date    HGBA1C 6.8 (H) 05/21/2025    HGBA1C 7.0 (H) 01/08/2025    CREATININE 0.75 (L) 05/21/2025        On ACE/ARB and Statin according to guidelines.   Follow ADA Diet. Avoid soda, sweets, and limit carbs (no more than 45-50 grams per meal).  Maintain healthy weight with goal BMI <30.  Exercise 5 times per week for 30 minutes per day.  Stressed importance of daily foot exams.              2. Class 3 severe obesity due to excess calories with serious comorbidity and body mass index (BMI) of 40.0 to 44.9 in adult  Overview:  Body mass index is 41.9 kg/m².  Goal BMI <30.  Exercise 5 times a  week for 30 minutes per day.  Avoid soda, simple sugars, excessive rice, potatoes or bread. Limit fast foods and fried foods.  Choose complex carbs in moderation (example: green vegetables, beans, oatmeal). Eat plenty of fresh fruits and vegetables with lean meats daily.  Do not skip meals. Eat a balanced portion size.  Avoid fad diets. Consider permanent healthy life style changes.     Assessment & Plan:  Patient has been successful with weight loss with Mounjaro.  He is not interested in increasing his dose at this time.      3. Primary hypertension  Overview:  Hypertension Medications               irbesartan-hydrochlorothiazide (AVALIDE) 150-12.5 mg per tablet Take 0.5 tablets by mouth every morning.        Low Sodium Diet (DASH Diet - Less than 2 grams of sodium per day).  Monitor blood pressure daily and log. Report consistent numbers greater than 140/90.  Maintain healthy weight with goal BMI <30. Exercise 30 minutes per day, 5 days per week.    Assessment & Plan:  Well-controlled.  Continue above regimen      4. Mixed hyperlipidemia  Overview:  Hyperlipidemia Medications                    Zetia 10 mg Take 1 tablet by mouth once daily.    pitavastatin calcium (LIVALO) 2 mg Tab tablet Take 1 tablet (1 mg total) by mouth every evening.     Lovastatin discontinued last visit  Patient is diabetic  Diabetic LDL-C goal: Below 70      Assessment & Plan:  Lab Results   Component Value Date    TRIG 131 05/21/2025    TRIG 227 (A) 01/30/2023    HDL 55 05/21/2025    HDL 49 01/30/2023    TOTALCHOLEST 4.7 01/30/2023    LDLCALC 86 05/21/2025     LDL above goal.  Patient has not been completely compliant with his pitavastatin.  He is trying to wean himself off.  He has not been following his diet.  We will repeat lipid panel in 3 months      5. Carotid bruit, unspecified laterality  -     US Carotid Bilateral; Future; Expected date: 05/29/2025         No follow-ups on file. In addition to their scheduled follow up, the  patient has also been instructed to follow up on as needed basis.     No future appointments.       Erika Fan MD

## 2025-05-29 NOTE — ASSESSMENT & PLAN NOTE
At goal.  Continue above regimen.  Lab Results   Component Value Date    HGBA1C 6.8 (H) 05/21/2025    HGBA1C 7.0 (H) 01/08/2025    CREATININE 0.75 (L) 05/21/2025        On ACE/ARB and Statin according to guidelines.   Follow ADA Diet. Avoid soda, sweets, and limit carbs (no more than 45-50 grams per meal).  Maintain healthy weight with goal BMI <30.  Exercise 5 times per week for 30 minutes per day.  Stressed importance of daily foot exams.

## 2025-05-30 ENCOUNTER — TELEPHONE (OUTPATIENT)
Dept: FAMILY MEDICINE | Facility: CLINIC | Age: 60
End: 2025-05-30
Payer: COMMERCIAL

## 2025-05-30 NOTE — TELEPHONE ENCOUNTER
Patient notified  of appointment at [facility] for Carotid U/S at 12 :30 be there for 12.00 pm. Orders faxed.

## 2025-06-09 ENCOUNTER — TELEPHONE (OUTPATIENT)
Dept: FAMILY MEDICINE | Facility: CLINIC | Age: 60
End: 2025-06-09
Payer: COMMERCIAL

## 2025-06-09 RX ORDER — CIPROFLOXACIN AND DEXAMETHASONE 3; 1 MG/ML; MG/ML
4 SUSPENSION/ DROPS AURICULAR (OTIC) 2 TIMES DAILY
Qty: 7.5 ML | Refills: 0 | Status: SHIPPED | OUTPATIENT
Start: 2025-06-09

## 2025-06-09 NOTE — TELEPHONE ENCOUNTER
Copied from CRM #[id number]. Topic: Appointments - Amb Referral  >> Jun 9, 2025  3:29 PM Brayden wrote:  .Who Called: Herbert Ramirez    Caller is requesting information on test results.    Name of Test (Lab/Mammo/Etc): labs   Date of Test: n/a   Where the test was performed: [facility]   Ordering Provider: travis sanchez     Preferred Method of Contact: Phone Call  Patient's Preferred Phone Number on File: [phone]   Best Call Back Number, if different:  Additional Information: pt states that he never got the results of the test from the clinic and they are closed

## 2025-06-09 NOTE — LETTER
AUTHORIZATION FOR RELEASE OF   CONFIDENTIAL INFORMATION    Dear [facility],    We are seeing Herbert Ramirez, date of birth 1965, in the clinic at No Department Specified. Isaac Hernandez Sr., MD (Inactive) is the patient's PCP. Herbert Ramirez has an outstanding lab/procedure at the time we reviewed his chart. In order to help keep his health information updated, he has authorized us to request the following medical record(s):        (  )  MAMMOGRAM                                      (  )  COLONOSCOPY      (  )  PAP SMEAR                                          (  )  OUTSIDE LAB RESULTS     (  )  DEXA SCAN                                          (  )  EYE EXAM            (  )  FOOT EXAM                                          (  )  ENTIRE RECORD     (  )  OUTSIDE IMMUNIZATIONS                 ( X )  Dr. Erika Fan ordered a ultrasounds carotid that patient had done, please send results.         Please fax records to [facility], Bourgeois, Leonard Jb. Sr., MD (Inactive), [phone].     If you have any questions, please contact [phone].          Patient Name: Herbert Ramirez  : 1965  Patient Phone #: [phone]

## 2025-06-24 DIAGNOSIS — E78.2 MIXED HYPERLIPIDEMIA: ICD-10-CM

## 2025-06-24 RX ORDER — PITAVASTATIN CALCIUM 2.09 MG/1
2 TABLET, FILM COATED ORAL 2 TIMES DAILY
Qty: 180 TABLET | Refills: 3 | Status: SHIPPED | OUTPATIENT
Start: 2025-06-24 | End: 2026-06-24

## 2025-06-24 RX ORDER — PITAVASTATIN CALCIUM 4.18 MG/1
1 TABLET, FILM COATED ORAL DAILY
Qty: 90 TABLET | Refills: 3 | Status: SHIPPED | OUTPATIENT
Start: 2025-06-24 | End: 2025-06-24

## 2025-07-10 ENCOUNTER — OFFICE VISIT (OUTPATIENT)
Dept: FAMILY MEDICINE | Facility: CLINIC | Age: 60
End: 2025-07-10
Payer: COMMERCIAL

## 2025-07-10 VITALS
BODY MASS INDEX: 42.3 KG/M2 | RESPIRATION RATE: 15 BRPM | WEIGHT: 294.81 LBS | DIASTOLIC BLOOD PRESSURE: 78 MMHG | SYSTOLIC BLOOD PRESSURE: 134 MMHG | HEART RATE: 76 BPM | TEMPERATURE: 98 F | OXYGEN SATURATION: 97 %

## 2025-07-10 DIAGNOSIS — J32.9 RHINOSINUSITIS: ICD-10-CM

## 2025-07-10 PROCEDURE — 1159F MED LIST DOCD IN RCRD: CPT | Mod: CPTII,,,

## 2025-07-10 PROCEDURE — 3062F POS MACROALBUMINURIA REV: CPT | Mod: CPTII,,,

## 2025-07-10 PROCEDURE — 1160F RVW MEDS BY RX/DR IN RCRD: CPT | Mod: CPTII,,,

## 2025-07-10 PROCEDURE — 3066F NEPHROPATHY DOC TX: CPT | Mod: CPTII,,,

## 2025-07-10 PROCEDURE — 3044F HG A1C LEVEL LT 7.0%: CPT | Mod: CPTII,,,

## 2025-07-10 PROCEDURE — 3075F SYST BP GE 130 - 139MM HG: CPT | Mod: CPTII,,,

## 2025-07-10 PROCEDURE — 3008F BODY MASS INDEX DOCD: CPT | Mod: CPTII,,,

## 2025-07-10 PROCEDURE — 99213 OFFICE O/P EST LOW 20 MIN: CPT | Mod: ,,,

## 2025-07-10 PROCEDURE — 3078F DIAST BP <80 MM HG: CPT | Mod: CPTII,,,

## 2025-07-10 RX ORDER — DOXYCYCLINE 100 MG/1
100 CAPSULE ORAL 2 TIMES DAILY
Qty: 10 CAPSULE | Refills: 0 | Status: SHIPPED | OUTPATIENT
Start: 2025-07-10 | End: 2025-07-15

## 2025-07-10 RX ORDER — PITAVASTATIN CALCIUM 4.18 MG/1
1 TABLET, FILM COATED ORAL
COMMUNITY
Start: 2025-06-26

## 2025-07-10 RX ORDER — FLUTICASONE PROPIONATE 50 MCG
1 SPRAY, SUSPENSION (ML) NASAL DAILY
Qty: 15.8 ML | Refills: 0 | Status: SHIPPED | OUTPATIENT
Start: 2025-07-10

## 2025-07-10 NOTE — PROGRESS NOTES
[facility]  Erika Fan MD    Patient ID: [id number]     Chief Complaint: Sinus Problem      HPI:     Herbert Ramirez is a 59 y.o. male with  hypertension, hyperlipidemia, type 2 diabetes, obesity, venous stasis, fatty liver, RAYA, and seasonal allergies here today for sinus problem.     Patient reports purulent post nasal and nasal drainage from the left side for the last several days. Also reports R maxillary sinus pain. He started having lower left dental pain for about 2 days.   Saw his dentist this morning, but he did not see a dental infection.     He has had sinus infections in the past, but rarely. He has allergic rhinitis every summer. Not using his flonase daily.     No fevers.        Past Medical History:   Diagnosis Date    Albuminuria     Elevated LFTs     Fatty liver     History of cataract     Hyperlipidemia     Hypertension     Obesity, unspecified     RAYA (obstructive sleep apnea)     Seasonal allergies     Type 2 diabetes mellitus without complications     Umbilical hernia     Venous stasis         Past Surgical History:   Procedure Laterality Date    CATARACT EXTRACTION Right     COLONOSCOPY  08/24/2014    Dr. Isbell - f/u 10 years    EYE SURGERY      Cataract    HERNIA REPAIR      Belly button    NOSE SURGERY      UMBILICAL HERNIA REPAIR          Social History     Tobacco Use    Smoking status: Never    Smokeless tobacco: Never   Substance and Sexual Activity    Alcohol use: Not Currently     Comment: Recovering    Drug use: Never    Sexual activity: Not Currently     Partners: Female     Birth control/protection: None        Current Outpatient Medications   Medication Instructions    ascorbic acid (vitamin C) (VITAMIN C) 1,000 mg, Daily    ascorbic acid-multivit-min (EMERGEN-C) 1,000 mg PwEP Take by mouth.    BERBERINE CHLORIDE ORAL Take by mouth.    bergamot extract (CITRUS BERGAMOT ORAL) Take by mouth.    calcium carbonate (OS-NELIA) 600 mg calcium (1,500 mg) Tab Daily     calcium carbonate/vitamin D3 (VITAMIN D-3 ORAL) 1,000 mg    cholecalciferol, vitD3,/vit K2 (VITAMIN D3-VITAMIN K2 ORAL) Oral, Daily    ciprofloxacin-dexAMETHasone 0.3-0.1% (CIPRODEX) 0.3-0.1 % DrpS 4 drops, Both Ears, 2 times daily    cyanocobalamin (VITAMIN B-12) 100 MCG tablet Daily    dapagliflozin propanediol (FARXIGA) 10 mg, Oral, Daily    doxycycline (VIBRAMYCIN) 100 mg, Oral, 2 times daily    ezetimibe (ZETIA) 10 mg, Oral, Daily    FLAXSEED ORAL Take by mouth.    fluticasone propionate (FLONASE) 50 mcg, Each Nostril, Daily    glimepiride (AMARYL) 1 mg, Oral, 2 times daily after meals    irbesartan-hydrochlorothiazide (AVALIDE) 150-12.5 mg per tablet 0.5 tablets, Oral, Every morning    Lactobac no.41/Bifidobact no.7 (PROBIOTIC-10 ORAL) 10 mg, Daily    metFORMIN (GLUCOPHAGE-XR) 1,000 mg, Oral, 2 times daily with meals    MOUNJARO 5 mg, Subcutaneous, Every 7 days    omega 3-dha-epa-fish oil (FISH OIL) 1,000 mg (120 mg-180 mg) Cap 1 capsule, Daily    pitavastatin calcium (LIVALO) 4 mg Tab 1 tablet    psyllium husk (METAMUCIL) 3.4 gram/5.4 gram Powd Daily    pyridoxine (vitamin B6) (VITAMIN B-6) 250 mg, Daily    RESVERATROL ORAL Take by mouth.    selenium 200 mcg, Daily    turmeric root extract 500 mg Cap Take by mouth.    vitamin E 400 Units, Daily    ZINC ACETATE ORAL Take by mouth.       Review of patient's allergies indicates:   Allergen Reactions    Statins-hmg-coa reductase inhibitors     Trulicity [dulaglutide]         Patient Care Team:  Erika Fan MD as PCP - General (Family Medicine)  Jae Isbell MD as Consulting Physician (Gastroenterology)  Warner Lopez MD (Ophthalmology)  Aixa Manjarrez DPM as Consulting Physician (Podiatry)     Subjective:     Review of Systems    12 point review of systems conducted, negative except as stated in the history of present illness. See HPI for details.    Objective:     Visit Vitals  /78 (Patient Position: Sitting)   Pulse 76   Temp 97.7 °F (36.5  °C) (Oral)   Resp 15   Wt 133.7 kg (294 lb 12.8 oz)   SpO2 97%   BMI 42.30 kg/m²       Physical Exam  Vitals and nursing note reviewed.   Constitutional:       General: He is not in acute distress.     Appearance: Normal appearance. He is not ill-appearing or diaphoretic.   HENT:      Head: Normocephalic and atraumatic.      Comments: Left maxillary sinus tenderness     Nose: Congestion present.      Mouth/Throat:      Mouth: Mucous membranes are moist.      Pharynx: Oropharynx is clear. No oropharyngeal exudate or posterior oropharyngeal erythema.   Eyes:      Extraocular Movements: Extraocular movements intact.      Conjunctiva/sclera: Conjunctivae normal.   Cardiovascular:      Rate and Rhythm: Normal rate and regular rhythm.      Pulses: Normal pulses.      Heart sounds: No murmur heard.  Pulmonary:      Effort: Pulmonary effort is normal. No respiratory distress.      Breath sounds: Normal breath sounds. No wheezing, rhonchi or rales.   Skin:     General: Skin is warm and dry.   Neurological:      Mental Status: He is alert and oriented to person, place, and time. Mental status is at baseline.   Psychiatric:         Mood and Affect: Mood normal.         Labs Reviewed:     Chemistry:  Lab Results   Component Value Date     05/21/2025    K 5.0 05/21/2025    BUN 13 05/21/2025    CREATININE 0.75 (L) 05/21/2025    EGFRNORACEVR 103 05/21/2025    CALCIUM 9.7 05/21/2025    ALBUMIN 4.7 05/21/2025    BILIDIR <0.20 12/18/2023    AST 30 05/21/2025    ALT 26 05/21/2025    NYVYRUMH16KC 70.8 01/08/2025    TSH 1.870 07/01/2024        Lab Results   Component Value Date    HGBA1C 6.8 (H) 05/21/2025        Hematology:  Lab Results   Component Value Date    WBC 7.3 01/08/2025    HGB 16.1 01/08/2025    HCT 48.6 01/08/2025     01/08/2025       Lipid Panel:  Lab Results   Component Value Date    CHOL 164 05/21/2025    HDL 55 05/21/2025    TRIG 131 05/21/2025    TOTALCHOLEST 4.7 01/30/2023        Urine:  Lab Results    Component Value Date    PROTEINUA 2+ (A) 05/21/2025    LEUKOCYTESUR Negative 05/21/2025    RBCUA 0-2 05/21/2025    WBCUA None seen 05/21/2025    BACTERIA None seen 05/21/2025    CREATRANDUR 69.2 05/21/2025        Assessment:       ICD-10-CM ICD-9-CM   1. Rhinosinusitis  J32.9 473.9        Plan:     1. Rhinosinusitis  -     fluticasone propionate (FLONASE) 50 mcg/actuation nasal spray; 1 spray (50 mcg total) by Each Nostril route once daily.  Dispense: 15.8 mL; Refill: 0  -     doxycycline (VIBRAMYCIN) 100 MG Cap; Take 1 capsule (100 mg total) by mouth 2 (two) times daily. for 5 days  Dispense: 10 capsule; Refill: 0     Patient with purulent nasal and postnasal drainage and maxillary sinus tenderness.  We will have him restart his daily Flonase.  [facility] sinus rinses samples given today.  We will treat for acute bacterial rhinosinusitis with doxycycline.  Consider imaging of the sinuses in the future if current.    Follow up in about 4 weeks (around 8/7/2025) for DM, A1c. In addition to their scheduled follow up, the patient has also been instructed to follow up on as needed basis.     Future Appointments   Date Time Provider Department Center   8/14/2025  8:00 AM Erika Fan MD [facility]          Erika Fan MD

## 2025-08-04 ENCOUNTER — TELEPHONE (OUTPATIENT)
Dept: FAMILY MEDICINE | Facility: CLINIC | Age: 60
End: 2025-08-04
Payer: COMMERCIAL

## 2025-08-04 NOTE — TELEPHONE ENCOUNTER
Copied from CRM #[id number]. Topic: General Inquiry - Patient Advice  >> Aug 4, 2025  4:02 PM Marjan wrote:  Who Called: Herbert Ramirez    Caller is requesting assistance/information from provider's office.    Symptoms (please be specific):    How long has patient had these symptoms:    List of preferred pharmacies on file (remove unneeded): [unfilled]  If different, enter pharmacy into here including location and phone number:       Preferred Method of Contact: Phone Call  Patient's Preferred Phone Number on File: [phone]   Best Call Back Number, if different:  Additional Information: pt called to speak with nurse has questions regarding last appt and bills pls advise

## 2025-08-04 NOTE — TELEPHONE ENCOUNTER
Patient was questioning bill of $23. Educated we have a billing department that handles this but it was more than likely his % after his deductible until he reaches his out of pocket. He was thankful and he remembered that is what that is. No further questions at this time.

## 2025-08-07 ENCOUNTER — PATIENT MESSAGE (OUTPATIENT)
Dept: FAMILY MEDICINE | Facility: CLINIC | Age: 60
End: 2025-08-07
Payer: COMMERCIAL

## 2025-08-14 ENCOUNTER — OFFICE VISIT (OUTPATIENT)
Dept: FAMILY MEDICINE | Facility: CLINIC | Age: 60
End: 2025-08-14
Payer: COMMERCIAL

## 2025-08-14 VITALS
OXYGEN SATURATION: 98 % | TEMPERATURE: 98 F | WEIGHT: 285.63 LBS | BODY MASS INDEX: 40.98 KG/M2 | HEART RATE: 83 BPM | RESPIRATION RATE: 15 BRPM | DIASTOLIC BLOOD PRESSURE: 78 MMHG | SYSTOLIC BLOOD PRESSURE: 134 MMHG

## 2025-08-14 DIAGNOSIS — J32.1 FRONTAL SINUSITIS, UNSPECIFIED CHRONICITY: ICD-10-CM

## 2025-08-14 DIAGNOSIS — E66.813 CLASS 3 SEVERE OBESITY DUE TO EXCESS CALORIES WITH SERIOUS COMORBIDITY AND BODY MASS INDEX (BMI) OF 40.0 TO 44.9 IN ADULT: ICD-10-CM

## 2025-08-14 DIAGNOSIS — I10 PRIMARY HYPERTENSION: Primary | ICD-10-CM

## 2025-08-14 DIAGNOSIS — R80.9 ALBUMINURIA: ICD-10-CM

## 2025-08-14 DIAGNOSIS — E11.42 TYPE 2 DIABETES MELLITUS WITH DIABETIC POLYNEUROPATHY, WITHOUT LONG-TERM CURRENT USE OF INSULIN: ICD-10-CM

## 2025-08-14 DIAGNOSIS — E11.9 TYPE 2 DIABETES MELLITUS WITHOUT COMPLICATION, WITHOUT LONG-TERM CURRENT USE OF INSULIN: ICD-10-CM

## 2025-08-14 LAB — HBA1C MFR BLD: 6.7 %

## 2025-08-14 RX ORDER — AMOXICILLIN AND CLAVULANATE POTASSIUM 875; 125 MG/1; MG/1
1 TABLET, FILM COATED ORAL EVERY 12 HOURS
Qty: 14 TABLET | Refills: 0 | Status: SHIPPED | OUTPATIENT
Start: 2025-08-14 | End: 2025-08-21

## 2025-09-04 DIAGNOSIS — J32.9 RHINOSINUSITIS: ICD-10-CM

## 2025-09-04 RX ORDER — FLUTICASONE PROPIONATE 50 MCG
1 SPRAY, SUSPENSION (ML) NASAL DAILY
Qty: 24 ML | Refills: 3 | Status: SHIPPED | OUTPATIENT
Start: 2025-09-04 | End: 2025-12-03